# Patient Record
Sex: MALE | Race: WHITE | Employment: OTHER | URBAN - METROPOLITAN AREA
[De-identification: names, ages, dates, MRNs, and addresses within clinical notes are randomized per-mention and may not be internally consistent; named-entity substitution may affect disease eponyms.]

---

## 2017-01-27 ENCOUNTER — CARE COORDINATION (OUTPATIENT)
Dept: CARDIOLOGY | Facility: CLINIC | Age: 67
End: 2017-01-27

## 2017-01-27 DIAGNOSIS — I48.0 PAROXYSMAL ATRIAL FIBRILLATION (H): ICD-10-CM

## 2017-01-27 DIAGNOSIS — Z79.899 ON AMIODARONE THERAPY: Primary | ICD-10-CM

## 2017-01-31 ENCOUNTER — CARE COORDINATION (OUTPATIENT)
Dept: CARDIOLOGY | Facility: CLINIC | Age: 67
End: 2017-01-31

## 2017-01-31 DIAGNOSIS — I48.0 PAROXYSMAL ATRIAL FIBRILLATION (H): Primary | ICD-10-CM

## 2017-01-31 DIAGNOSIS — I48.91 ATRIAL FIBRILLATION STATUS POST CARDIOVERSION (H): ICD-10-CM

## 2017-01-31 DIAGNOSIS — I10 HTN (HYPERTENSION): ICD-10-CM

## 2017-01-31 RX ORDER — LISINOPRIL 20 MG/1
20 TABLET ORAL DAILY
Qty: 93 TABLET | Refills: 3
Start: 2017-01-31

## 2017-01-31 RX ORDER — DILTIAZEM HCL 90 MG
90 TABLET ORAL 2 TIMES DAILY
Qty: 90 TABLET | Refills: 3
Start: 2017-01-31 | End: 2017-08-03

## 2017-01-31 RX ORDER — AMIODARONE HYDROCHLORIDE 200 MG/1
200 TABLET ORAL EVERY OTHER DAY
Qty: 93 TABLET | Refills: 3
Start: 2017-01-31 | End: 2017-08-03

## 2017-01-31 NOTE — PROGRESS NOTES
Pt called, updated medication list.     Dc summary/[facility] stated pt only taking dilt 90 mg in am only, but pt has been taking 60 mg in pm.    Date: 1/31/2017    Time of Call: 11:09 AM     Diagnosis:  PVC     [ TORB ] Ordering provider: Dr Pérez   Order:   Finish supply of diltiazem 60 mg -once supply is gone  Take diltiazem 90 mg am.     If pt notes increase in SOB or tachycardia, may increase to diltiazem 90 mg bid.      Order received by: paula joel rn      Follow-up/additional notes: pt updated, agreed with plan.   Follow up schd in April with labs, pft.

## 2017-04-20 ENCOUNTER — PRE VISIT (OUTPATIENT)
Dept: CARDIOLOGY | Facility: CLINIC | Age: 67
End: 2017-04-20

## 2017-04-26 NOTE — TELEPHONE ENCOUNTER
Progress Note    Mr. Soto is a 66 year old male who has a past medical history significant for of NICM (LVEF 40-45%), CKD, HTN, Atrial fibrillation. CHADSVASC= 2 (+HTN, +HF)    He had frequent PVCs and NSVT in past.  He underwent VPC RFA  which appeared to eliminate one PVC morphology but a second near base of Pap muscle . Attempted RFA at that site caused Pericardial effusion requiring drainage. He has recovered well with Rx of colchicine and some prednisone.     He continues on warfarin followed in [place].    Tolerates amio 200 QD but feels that HR is slow. We will reduce to 100 daily    No new CV symptoms. Remains active golfing and bowling without any evident limitation. Asked about snowmobiling at high altitude. I advised against..      PAST MEDICAL HISTORY:  Past Medical History:   Diagnosis Date     Atrial fibrillation (H)      Cardiomyopathy (H)      CKD (chronic kidney disease)      Hypertension      Other premature beats      Palpitations      Fam HISTORY  Positive for CM requiring OHT in son    CURRENT MEDICATIONS:  Current Outpatient Prescriptions   Medication Sig Dispense Refill     amiodarone (PACERONE/CODARONE) 200 MG tablet Take 1 tablet (200 mg) by mouth every other day 93 tablet 3     lisinopril (PRINIVIL/ZESTRIL) 20 MG tablet Take 1 tablet (20 mg) by mouth daily In am 93 tablet 3     diltiazem (CARDIZEM) 90 MG tablet Take 1 tablet (90 mg) by mouth 2 times daily In am 90 tablet 3     warfarin (COUMADIN) 5 MG tablet Take 1-1.5 tablets (5-7.5 mg) by mouth daily 130 tablet 3     metoprolol (TOPROL-XL) 25 MG 24 hr tablet Take 1 tablet (25 mg) by mouth daily 90 tablet 3     fish oil-omega-3 fatty acids (FISH OIL) 1000 MG capsule Take 1 g by mouth daily.       Multiple Vitamin (MULTI VITAMIN MENS PO) Take  by mouth. 1 tablet daily       co-enzyme Q-10 (COQ10) 100 MG CAPS Take  by mouth daily. 1 caps         PAST SURGICAL HISTORY:  Past Surgical History:   Procedure Laterality Date     ANESTHESIA  CARDIOVERSION N/A 3/17/2015    Procedure: ANESTHESIA CARDIOVERSION;  Surgeon: Generic Anesthesia Provider;  Location: [facility]     ANESTHESIA CARDIOVERSION N/A 3/2/2016    Procedure: ANESTHESIA CARDIOVERSION;  Surgeon: GENERIC ANESTHESIA PROVIDER;  Location: [facility] OR     EP ABLATION / EP STUDIES  02/26/16    PVC ablation w/P. effusion/tamponade     LAPAROSCOPIC CHOLECYSTECTOMY  2/25/2014    Procedure: LAPAROSCOPIC CHOLECYSTECTOMY;  Laparoscopic Cholecystectomy;  Surgeon: Sulma Fenton MD;  Location: [facility] OR       ALLERGIES:   No Known Allergies              SOCIAL HISTORY:  Social History   Substance Use Topics     Smoking status: Never Smoker     Smokeless tobacco: Not on file     Alcohol use No       ROS:   Constitutional: No fever, chills, or sweats. Weight stable.   ENT: No visual disturbance, ear ache, epistaxis, sore throat.   Cardiovascular: As per HPI. CP elated to pericardial effusion / pericarditis is resolved  Respiratory: No cough, hemoptysis.    GI: No nausea, vomiting, hematemesis, melena, or hematochezia.   : No hematuria.   Integument: Negative.   Psychiatric: Negative.   Hematologic: no easy bleeding.  Neuro: Negative.   Endocrinology: No significant heat or cold intolerance   Musculoskeletal: No myalgia.    Exam:  There were no vitals taken for this visit.  GENERAL APPEARANCE: Pleasant healthy, alert and no distress  HEENT: no icterus, no xanthelasmas, normal pupil size and reaction, normal palate, mucosa moist, no central cyanosis  NECK: no adenopathy, no asymmetry, masses, or scars, thyroid normal to palpation and no bruits, JVP not elevated  RESPIRATORY: lungs clear to auscultation - no rales, rhonchi or wheezes, no use of accessory muscles, no retractions, respirations are unlabored, normal respiratory rate  CARDIOVASCULAR: regular rhythm, normal S1 with physiologic split S2, no S3 or S4 and no murmur, click or rub, precordium quiet with normal PMI.  ABDOMEN: soft, non tender, without  hepatosplenomegaly, no masses palpable, bowel sounds normal, aorta not enlarged by palpation, no abdominal bruits  EXTREMITIES: peripheral pulses normal, no edema, no bruits  NEURO: alert and oriented to person/place/time, normal speech, gait and affect  VASC:pulses are normal in volumes and symmetric bilaterally. No bruits are heard.  SKIN: no ecchymoses, no rashes    Labs:  CBC RESULTS:   Lab Results   Component Value Date    WBC 11.0 02/29/2016    RBC 3.53 (L) 02/29/2016    HGB 11.0 (L) 02/29/2016    HCT 33.2 (L) 02/29/2016    MCV 94 02/29/2016    MCH 31.2 02/29/2016    MCHC 33.1 02/29/2016    RDW 13.8 02/29/2016     (L) 02/29/2016       BMP RESULTS:  Lab Results   Component Value Date     03/02/2016    POTASSIUM 3.5 03/02/2016    CHLORIDE 101 03/02/2016    CO2 29 03/02/2016    ANIONGAP 6 03/02/2016     (H) 03/02/2016    BUN 27 03/02/2016    CR 1.44 (H) 03/02/2016    GFRESTIMATED 49 (L) 03/02/2016    GFRESTBLACK 59 (L) 03/02/2016    STEPHEN 8.8 03/02/2016        INR RESULTS:  Lab Results   Component Value Date    INR 1.08 03/02/2016    INR 1.14 03/01/2016    INR 1.21 (H) 02/26/2016    INR 2.22 (H) 07/02/2015       Procedures:    9/29/2016: Cardiac MRI :   Indication: Mr. 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 Repeat CMR to assess overall function.   Comparison: CMR 1/16  Technical quality: Good         IMPRESSION:  1. Moderately dilated left ventricular size and mildly reduced  systolic function with a calculated ejection fraction of 42 %.  2. Normal right ventricular size and mildly reduced systolic function  with a calculated ejection fraction of 47%.   3. On delayed enhancement imaging, there is focal epicardial  hyperenhancement in the basal inferolateral wall that appears less  prominent as compared to cardiac MRI 1/16.      The MRI sequences and  imaging planes in this study were tailored for  cardiac imaging and are suboptimal for evaluation of non-cardiac  structures.     FINDINGS     Left ventricle  Cavity size: Moderately dilated  Wall thickness: Normal  Global systolic function: Mildly depressed with a quantitative LV  ejection fraction of 42%.   Regional wall motion: Normal  Delayed enhancement: There is focal epicardial hyperenhancement in the  basal inferolateral wall that appears less prominent as compared to  cardiac MRI 1/16.      Right ventricle  Cavity size: Normal  Wall thickness: Normal  Global systolic function: Mildly depressed with a quantitative RV  ejection fraction of 47%.   Regional wall motion: Normal     Atria  LA size: Moderately dilated  RA size: Mildly dilated     Valves  Aortic Valve  Valve morphology: Tricuspid  Aortic stenosis: None  Aortic regurgitation: None     Mitral Valve  Mitral stenosis: None  Mitral regurgitation: Mild     Tricuspid Valve   Tricuspid stenosis: None  Tricuspid regurgitation: Mild     Pulmonic Valve  Pulmonic stenosis: None  Pulmonic regurgitation: None     Extracardiac  Aortic root dimension: 3.4 cm (at the sinuses of Valsalva)  Main PA dimension: 2.8 cm  Pericardial thickness: Normal  Pericardial effusion:None  Pleural effusion: None     Measurements     LEFT VENTRICULAR VOLUME RESULTS      ED volume: 258.88 ml   ED volume index: 111.24 ml/m2   ED volume/HT: 137.73 ml/m   ES volume: 151.31 ml   ES volume index: 65.01 ml/m2   Stroke volume: 107.57 ml   Stroke volume index: 46.22 ml/m2   Cardiac output: 7.32 l/min   Cardiac output index: 3.14 l/(m2*min)   Ejection fraction: 41.55 %          RIGHT VENTRICULAR VOLUME RESULTS      ED volume: 186.13 ml   ED volume index: 79.98 ml/m2   ED volume/HT: 99.03 ml/m   ES volume: 97.38 ml   ES volume index: 41.84 ml/m2   Stroke volume: 88.75 ml   Stroke volume index: 38.13 ml/m2   Cardiac output: 6.04 l/min   Cardiac output index: 2.59 l/(m2*min)   Ejection fraction:  47.68 %       LV wall thickness - Anteroseptal: 1.0 cm  LV wall thickness - Inferolateral: 1.1 cm  LV wall thickness - Maximum: 1.0 cm  LV end-diastolic diameter: 6.8 cm  LV end-systolic diameter: 5.4 cm  LA sam-posterior diameter: 4.8 cm     Sedation and contrast  Sedation used: None  Contrast agent: Gadavist  Volume administered: mL     Pulse sequences used  SSFP Localizers  SSFP Cine  IR SSFP Single Shot  IR GRE Segmented  Contrast-enhanced early and late enhancement imaging  Image post-processing was performed on a [employer] workstation    2/26/2016: WHIT/PVC ablation:   - Pericardial effusion w/tamponade    10/21/15: 30 DEM- home clinic/[place]:      Echocardiogram, PFT's   7/2/2015:    Interpretation Summary  Left ventricular size cannot evaluate.  The Ejection Fraction is estimated at 40-45%.  Right ventricular function, chamber size, wall motion, and thickness are  normal.  Pulmonary artery systolic pressure is normal.  The inferior vena cava is normal.  No pericardial effusion is present.      Cardioversion: 3/17/15    Echocardiogram: 6/5/2014:  Global and regional left ventricular function is normal with an EF of   60-65%.Severe LA enlargement.    PatientHeight: 75 in  PatientWeight: 237 lbs  SystolicPressure: 142 mmHg  DiastolicPressure: 77 mmHg    Compared to Previous Study  This study was compared with the study from 6/28/2012 .  There has been no change.    CT ANGIO CORONARY : Jul 3, 2012   Indication: Chest pain   Overall quality of the study: Excellent.   IMPRESSION:  1. Total Agatston score 0, placing the patient in the 0 percentile  when compared to age and gender matched control group.  2. At most, trivial coronary artery disease.  3. Mildly dilated sinuses of Valsalva.    ECHOCARDIOGRAM 6/28/2012  Interpretation Summary  No significant valvular abnormalities were noted.  Global and regional left ventricular function is normal with an EF of 60-65%.   Grade I of diastolic dysfunction Global right  ventricular function is normal.   Severe left atrial enlargement is present. Pulmonary artery systolic pressure   cannot be assessed. The inferior vena cava cannot be assessed. No   pericardial effusion is present.    Assessment and Plan:   Mr. Soto is a 66 year old male who has a past medical history significant for of NICM (LVEF 40-45%), CKD, HTN, and Atrial fibrillation. CHADSVASC= 2 (+HTN, +HF).    1. NICM, clinically stable. MR showed    -  2. Atrial fibrillation, PVC's the patient's ECG today shows he is in  sinus rhythm PVC having RBBB morphology with LAD present. Consistent with PVC #2 during EPS    3. Bradycardia. Will reduce amio to 100 daily    4. Advised against high altitude snowmobiling. Other wise remain active as in recent past    5 Continue warfarin    I very much appreciated the opportunity to see and assess Mr Endy Soto in the clinic today    Please do not hesitate to contact my office if you have any questions or concerns.       Nelson Pérez MD  Cardiac Arrhythmia Service  [facility]  [phone]  CC  Patient Care Team:  Dinorah Ugarte as PCP - General (Family Practice)  Nelson Pérez MD as Cardiologist (Cardiology)  Daphnie Aguila, KIARA as Nurse Coordinator (Cardiology)  CAMILLE CHAU

## 2017-04-27 ENCOUNTER — OFFICE VISIT (OUTPATIENT)
Dept: CARDIOLOGY | Facility: CLINIC | Age: 67
End: 2017-04-27
Attending: INTERNAL MEDICINE
Payer: MEDICARE

## 2017-04-27 VITALS
BODY MASS INDEX: 29.68 KG/M2 | SYSTOLIC BLOOD PRESSURE: 140 MMHG | HEART RATE: 65 BPM | HEIGHT: 76 IN | WEIGHT: 243.7 LBS | DIASTOLIC BLOOD PRESSURE: 81 MMHG | OXYGEN SATURATION: 96 %

## 2017-04-27 DIAGNOSIS — Z79.899 ON AMIODARONE THERAPY: ICD-10-CM

## 2017-04-27 DIAGNOSIS — I48.0 PAROXYSMAL ATRIAL FIBRILLATION (H): ICD-10-CM

## 2017-04-27 DIAGNOSIS — I49.3 PVC'S (PREMATURE VENTRICULAR CONTRACTIONS): Primary | ICD-10-CM

## 2017-04-27 DIAGNOSIS — I42.8 NICM (NONISCHEMIC CARDIOMYOPATHY) (H): ICD-10-CM

## 2017-04-27 LAB
ALBUMIN SERPL-MCNC: 4.1 G/DL (ref 3.4–5)
ALP SERPL-CCNC: 62 U/L (ref 40–150)
ALT SERPL W P-5'-P-CCNC: 21 U/L (ref 0–70)
ANION GAP SERPL CALCULATED.3IONS-SCNC: 5 MMOL/L (ref 3–14)
AST SERPL W P-5'-P-CCNC: 18 U/L (ref 0–45)
BILIRUB SERPL-MCNC: 1.4 MG/DL (ref 0.2–1.3)
BUN SERPL-MCNC: 18 MG/DL (ref 7–30)
CALCIUM SERPL-MCNC: 9.5 MG/DL (ref 8.5–10.1)
CHLORIDE SERPL-SCNC: 107 MMOL/L (ref 94–109)
CO2 SERPL-SCNC: 27 MMOL/L (ref 20–32)
CREAT SERPL-MCNC: 1.42 MG/DL (ref 0.66–1.25)
ERYTHROCYTE [DISTWIDTH] IN BLOOD BY AUTOMATED COUNT: 12.8 % (ref 10–15)
GFR SERPL CREATININE-BSD FRML MDRD: 50 ML/MIN/1.7M2
GLUCOSE SERPL-MCNC: 85 MG/DL (ref 70–99)
HCT VFR BLD AUTO: 47.2 % (ref 40–53)
HGB BLD-MCNC: 15.7 G/DL (ref 13.3–17.7)
MCH RBC QN AUTO: 30.5 PG (ref 26.5–33)
MCHC RBC AUTO-ENTMCNC: 33.3 G/DL (ref 31.5–36.5)
MCV RBC AUTO: 92 FL (ref 78–100)
PLATELET # BLD AUTO: 213 10E9/L (ref 150–450)
POTASSIUM SERPL-SCNC: 4.2 MMOL/L (ref 3.4–5.3)
PROT SERPL-MCNC: 7.4 G/DL (ref 6.8–8.8)
RBC # BLD AUTO: 5.15 10E12/L (ref 4.4–5.9)
SODIUM SERPL-SCNC: 139 MMOL/L (ref 133–144)
TSH SERPL DL<=0.005 MIU/L-ACNC: 2.69 MU/L (ref 0.4–4)
WBC # BLD AUTO: 7.1 10E9/L (ref 4–11)

## 2017-04-27 PROCEDURE — 80053 COMPREHEN METABOLIC PANEL: CPT | Performed by: INTERNAL MEDICINE

## 2017-04-27 PROCEDURE — 36415 COLL VENOUS BLD VENIPUNCTURE: CPT | Performed by: INTERNAL MEDICINE

## 2017-04-27 PROCEDURE — 99213 OFFICE O/P EST LOW 20 MIN: CPT | Mod: ZP | Performed by: INTERNAL MEDICINE

## 2017-04-27 PROCEDURE — 99212 OFFICE O/P EST SF 10 MIN: CPT

## 2017-04-27 PROCEDURE — 93010 ELECTROCARDIOGRAM REPORT: CPT | Mod: ZP | Performed by: INTERNAL MEDICINE

## 2017-04-27 PROCEDURE — 93005 ELECTROCARDIOGRAM TRACING: CPT | Mod: ZF

## 2017-04-27 PROCEDURE — 85027 COMPLETE CBC AUTOMATED: CPT | Performed by: INTERNAL MEDICINE

## 2017-04-27 PROCEDURE — 84443 ASSAY THYROID STIM HORMONE: CPT | Performed by: INTERNAL MEDICINE

## 2017-04-27 ASSESSMENT — PAIN SCALES - GENERAL: PAINLEVEL: NO PAIN (0)

## 2017-04-27 NOTE — LETTER
4/27/2017    RE: Endy Soto  [address] [facility] [address]       Dear Colleague,    Thank you for the opportunity to participate in the care of your patient, Endy Soto, at the [facility] HEART [facility]. Please see a copy of my visit note below.          Clinical Cardiac Electrophysiology    Chief Complaint: follow up for PVCs    HPI: Mr. Soto is a 66 year old male who has a past medical history significant for of NICM (LVEF 40-45%), CKD, HTN, Atrial fibrillation. CHADSVASC= 2 (+HTN, +HF)     3/29/2016   He had frequent PVCs and NSVT in past. He underwent PVA RFA which appeared to eliminate one PVC morphology but a second near base of Pap muscle . Attempted RFA at that site caused Pericardial effusion requiring drainage. He has recovered well with Rx of colchicine and some prednisone. He continues on warfarin followed in [place]. Remains active golfing and bowling without any evident limitation.     4/27/2017  Current cardiac medications include amiodarone, lisinopril, diltiazem, metoprolol, fish oil, warfarin    Since last visit, he's doing well. No chest pain, dyspnea, palpitations.     Current Outpatient Prescriptions   Medication Sig Dispense Refill     amiodarone (PACERONE/CODARONE) 200 MG tablet Take 1 tablet (200 mg) by mouth every other day 93 tablet 3     lisinopril (PRINIVIL/ZESTRIL) 20 MG tablet Take 1 tablet (20 mg) by mouth daily In am 93 tablet 3     diltiazem (CARDIZEM) 90 MG tablet Take 1 tablet (90 mg) by mouth 2 times daily In am (Patient taking differently: Take 90 mg by mouth daily In am) 90 tablet 3     warfarin (COUMADIN) 5 MG tablet Take 1-1.5 tablets (5-7.5 mg) by mouth daily (Patient taking differently: Take 5-7.5 mg by mouth daily As directed by the coumadin clinic) 130 tablet 3     metoprolol (TOPROL-XL) 25 MG 24 hr tablet Take 1 tablet (25 mg) by mouth daily 90 tablet 3     fish oil-omega-3 fatty acids (FISH OIL) 1000 MG  capsule Take 1 g by mouth daily.       Multiple Vitamin (MULTI VITAMIN MENS PO) Take  by mouth. 1 tablet daily       co-enzyme Q-10 (COQ10) 100 MG CAPS Take  by mouth daily. 1 caps         Past Medical History:   Diagnosis Date     Atrial fibrillation (H)      Cardiomyopathy (H)      CKD (chronic kidney disease)      Hypertension      Other premature beats      Palpitations        Past Surgical History:   Procedure Laterality Date     ANESTHESIA CARDIOVERSION N/A 3/17/2015    Procedure: ANESTHESIA CARDIOVERSION;  Surgeon: Generic Anesthesia Provider;  Location: [facility] OR     ANESTHESIA CARDIOVERSION N/A 3/2/2016    Procedure: ANESTHESIA CARDIOVERSION;  Surgeon: GENERIC ANESTHESIA PROVIDER;  Location: [facility] OR     EP ABLATION / EP STUDIES  02/26/16    PVC ablation w/P. effusion/tamponade     LAPAROSCOPIC CHOLECYSTECTOMY  2/25/2014    Procedure: LAPAROSCOPIC CHOLECYSTECTOMY;  Laparoscopic Cholecystectomy;  Surgeon: Sulma Fenton MD;  Location: [facility] OR       No family history on file.    Social History   Substance Use Topics     Smoking status: Never Smoker     Smokeless tobacco: Not on file     Alcohol use No       No Known Allergies    ROS:   CONSTITUTIONAL:No report of fever, chills  RESPIRATORY: No cough, wheezing, SOB, or hemoptysis  CARDIOVASCULAR: see HPI  MUSCULO-SKELETAL: No joint pain/swelling, no muslce pain  NEURO: No paresthesias, syncope, pre-syncope, light headness, dizziness or vertigo  ENDOCRINE: No temperature intolerance, no skin/hair changes  PSYCHIATRIC: No change in mood, feeling down/anxious, no change in sleep or appetite  GI: no melena or hematochezia, no change in bowel habits  : no hematuria or dysurea, no hesitancy, dribbling or incontinence  HEME: no easy bruising or bleeding, no history of anemia, no history of blood clots  SKIN: no rashes or sores, no unusual itching. Some facial flushing    Physical Examination:  Vitals: /82 (BP Location: Right arm, Patient Position: Chair,  "Cuff Size: Adult Large)  Pulse 65  Ht 1.93 m (6' 4\")  Wt 110.5 kg (243 lb 11.2 oz)  SpO2 96%  BMI 29.66 kg/m2  BMI= Body mass index is 29.66 kg/(m^2).    GENERAL APPEARANCE: healthy, alert and no distress  NECK: JVP is not visible  CHEST: lungs clear to auscultation - no rales, rhonchi or wheezes  CARDIOVASCULAR: regular rhythm, normal S1 with physiologic split S2, no S3 or S4 and no murmur  ABDOMEN: soft, non tender, without hepatosplenomegaly  EXTREMITIES: no edema  NEURO: alert and oriented to person/place/time, normal speech, gait and affect  SKIN: no ecchymoses, no rashes    CMri 9/2016  1. Moderately dilated left ventricular size and mildly reduced systolic function with a calculated ejection fraction of 42 %.  2. Normal right ventricular size and mildly reduced systolic function with a calculated ejection fraction of 47%.   3. On delayed enhancement imaging, there is focal epicardial hyperenhancement in the basal inferolateral wall that appears less  prominent as compared to cardiac MRI 1/16.     Assessment and recommendations:  Mr. Soto is a 66 year old male who has a past medical history significant for of NICM (LVEF 40-45%), CKD, HTN, and Atrial fibrillation. CHADSVASC= 2 (+HTN, +HF).    1. NICM -  clinically stable. LVEF 42% on last MRI. Plan to repeat MRI prior to next visit.     2. PVC's - patient's ECG today shows he is in sinus rhythm PVC having RBBB morphology with LAD present. Consistent with PVC #2 during EPS.     3. Bradycardia - amio 100 daily    4. Atrial fibrillation - continue warfarin    I appreciate the chance to help with Mr. Soto care. Please let me know if you have any questions or concerns.    I very much appreciated the opportunity to see and assess Mr Endy Soto in the clinic with Diana Cedeno CNA. I spent approx 20 min with patient and spouse reviweing history and providing recommendations. Today I reviewed his ECG which showed occas PVC of single morphology.  He " denied any CP, SOBE or exertional intolerance. He did note some facial flushing that may be amio related. I advised his wearing hat whenever exposed to sun.  Amio labs reviewed and OK.  Please do not hesitate to contact my office if you have any questions or concerns.      Nelson Pérez MD  Cardiac Arrhythmia Service  [facility]  [phone]

## 2017-04-27 NOTE — MR AVS SNAPSHOT
After Visit Summary   4/27/2017    Endy Soto    MRN: [id number]           Patient Information     Date Of Birth          1950        Visit Information        Provider Department      4/27/2017 12:30 PM Nelson Pérez MD [facility]        Today's Diagnoses     PVC's (premature ventricular contractions)    -  1    NICM (nonischemic cardiomyopathy) (H)          Care Instructions    You will be scheduled for a follow up visit as instructed.    If you have any questions regarding to your visit please contact your care team:     Cardiology  Telephone Number    Daphnie Aguila [phone]   Or send a message to your provider via my chart.   For scheduling appts or procedures:    Sheela Franklin     [phone] or  [phone]   For the Device Clinic (Pacemakers and ICD's)   RN's :   Sandi Morgan  During business hours: [phone]    After business hours:   [phone]- select option 4 and ask for job code [phone].    You can also contact us using My Chart. If you need assistance in setting this up, please contact our office or ask at your follow up visit.     If you need a medication refill please contact your pharmacy. Please allow 3 business days for your refill to be completed.     As always, Thank you for trusting us with your health care needs!        Follow-ups after your visit        Follow-up notes from your care team     Return in about 6 months (around 10/27/2017) for johnathon, and cardiac MRI prior.      Your next 10 appointments already scheduled     Nov 02, 2017 11:00 AM CDT   MR MYOCARDIUM W CONTRAST with UUMR4, [facility] CV MR NURSE   [facility] [place] ([facility])    [address] [facility] [facility] [address]   [phone]           Take your medicines as usual, unless your doctor tells you not to. Bring a list of your current medicines to your exam (including vitamins, minerals and over-the-counter  drugs).  You will be given intravenous contrast for this exam. To prepare:   The day before your exam, drink extra fluids at least six 8-ounce glasses (unless your doctor tells you to restrict your fluids).   Have a blood test (creatinine test) within 30 days of your exam. Go to your clinic or Diagnostic Imaging Department for this test.  The MRI machine uses a strong magnet. Please wear clothes without metal (snaps, zippers). A sweatsuit works well, or we may give you a hospital gown.  Please remove any body piercings and hair extensions before you arrive. You will also remove watches, jewelry, hairpins, wallets, dentures, partial dental plates and hearing aids. You may wear contact lenses, and you may be able to wear your rings. We have a safe place to keep your personal items, but it is safer to leave them at home.   **IMPORTANT** THE INSTRUCTIONS BELOW ARE ONLY FOR THOSE PATIENTS WHO HAVE BEEN TOLD THEY WILL RECEIVE SEDATION OR GENERAL ANESTHESIA DURING THEIR MRI PROCEDURE:  IF YOU WILL RECEIVE SEDATION (take medicine to help you relax during your exam):   You must get the medicine from your doctor before you arrive. Bring the medicine to the exam. Do not take it at home.   Arrive one hour early. Bring someone who can take you home after the test. Your medicine will make you sleepy. After the exam, you may not drive, take a bus or take a taxi by yourself.   No eating 8 hours before your exam. You may have clear liquids up until 4 hours before your exam. (Clear liquids include water, clear tea, black coffee and fruit juice without pulp.)  IF YOU WILL RECEIVE ANESTHESIA (be asleep for your exam):   Arrive 1 1/2 hours early. Bring someone who can take you home after the test. You may not drive, take a bus or take a taxi by yourself.   No eating 8 hours before your exam. You may have clear liquids up until 4 hours before your exam. (Clear liquids include water, clear tea, black coffee and fruit juice without pulp.)   "Please call the Imaging Department at your exam site with any questions.            Nov 02, 2017  2:30 PM CDT   (Arrive by 2:15 PM)   RETURN ARRHYTHMIA with Nelson Pérez MD   [facility] ([facility])    [address] [id number] [phone]              Future tests that were ordered for you today     Open Future Orders        Priority Expected Expires Ordered    MRI Cardiac w/contrast Routine 10/2/2017 4/27/2018 4/27/2017            Who to contact     If you have questions or need follow up information about today's clinic visit or your schedule please contact [facility] directly at [phone].  Normal or non-critical lab and imaging results will be communicated to you by [employer], letter or phone within 4 business days after the clinic has received the results. If you do not hear from us within 7 days, please contact the clinic through [employer] or phone. If you have a critical or abnormal lab result, we will notify you by phone as soon as possible.  Submit refill requests through [employer] or call your pharmacy and they will forward the refill request to us. Please allow 3 business days for your refill to be completed.          Additional Information About Your Visit        Minggl Information     [employer] gives you secure access to your electronic health record. If you see a primary care provider, you can also send messages to your care team and make appointments. If you have questions, please call your primary care clinic.  If you do not have a primary care provider, please call [phone] and they will assist you.        Care EveryWhere ID     This is your Care EveryWhere ID. This could be used by other organizations to access your [place] medical records  [id number]        Your Vitals Were     Pulse Height Pulse Oximetry BMI (Body Mass Index)          65 1.93 m (6' 4\") 96% 29.66 kg/m2         Blood Pressure from Last 3 " Encounters:   04/27/17 140/81   09/29/16 148/87   03/24/16 139/85    Weight from Last 3 Encounters:   04/27/17 110.5 kg (243 lb 11.2 oz)   09/29/16 106.6 kg (235 lb)   03/24/16 106.7 kg (235 lb 3.2 oz)              We Performed the Following     EKG 12-lead, tracing only (Future)          Today's Medication Changes          These changes are accurate as of: 4/27/17  1:31 PM.  If you have any questions, ask your nurse or doctor.               These medicines have changed or have updated prescriptions.        Dose/Directions    diltiazem 90 MG tablet   Commonly known as:  CARDIZEM   This may have changed:    - when to take this  - additional instructions   Used for:  Atrial fibrillation status post cardioversion (H)        Dose:  90 mg   Take 1 tablet (90 mg) by mouth 2 times daily In am   Quantity:  90 tablet   Refills:  3       warfarin 5 MG tablet   Commonly known as:  COUMADIN   This may have changed:  additional instructions   Used for:  Paroxysmal atrial fibrillation (H)        Dose:  5-7.5 mg   Take 1-1.5 tablets (5-7.5 mg) by mouth daily   Quantity:  130 tablet   Refills:  3                Primary Care Provider Office Phone # Fax #    Marko Aparicio [phone]       [address] [address]        Thank you!     Thank you for choosing [facility]  for your care. Our goal is always to provide you with excellent care. Hearing back from our patients is one way we can continue to improve our services. Please take a few minutes to complete the written survey that you may receive in the mail after your visit with us. Thank you!             Your Updated Medication List - Protect others around you: Learn how to safely use, store and throw away your medicines at www.disposemymeds.org.          This list is accurate as of: 4/27/17  1:31 PM.  Always use your most recent med list.                   Brand Name Dispense Instructions for use    amiodarone 200 MG  tablet    PACERONE/CODARONE    93 tablet    Take 1 tablet (200 mg) by mouth every other day       co-enzyme Q-10 100 MG Caps capsule      Take  by mouth daily. 1 caps       diltiazem 90 MG tablet    CARDIZEM    90 tablet    Take 1 tablet (90 mg) by mouth 2 times daily In am       fish oil-omega-3 fatty acids 1000 MG capsule      Take 1 g by mouth daily.       lisinopril 20 MG tablet    PRINIVIL/ZESTRIL    93 tablet    Take 1 tablet (20 mg) by mouth daily In am       metoprolol 25 MG 24 hr tablet    TOPROL-XL    90 tablet    Take 1 tablet (25 mg) by mouth daily       MULTI VITAMIN MENS PO      Take  by mouth. 1 tablet daily       warfarin 5 MG tablet    COUMADIN    130 tablet    Take 1-1.5 tablets (5-7.5 mg) by mouth daily

## 2017-04-27 NOTE — PATIENT INSTRUCTIONS
You will be scheduled for a follow up visit as instructed.    If you have any questions regarding to your visit please contact your care team:     Cardiology  Telephone Number    Daphnie Aguila [phone]   Or send a message to your provider via my chart.   For scheduling appts or procedures:    Sheela Franklin     [phone] or  [phone]   For the Device Clinic (Pacemakers and ICD's)   RN's :   Sandi Morgan  During business hours: [phone]    After business hours:   [phone]- select option 4 and ask for job code [id number].    You can also contact us using My Chart. If you need assistance in setting this up, please contact our office or ask at your follow up visit.     If you need a medication refill please contact your pharmacy. Please allow 3 business days for your refill to be completed.     As always, Thank you for trusting us with your health care needs!

## 2017-04-27 NOTE — NURSING NOTE
Chief Complaint   Patient presents with     Follow Up For     lab, PFT, EKG- PAF, 6 MFU (9/29/16), hx PVC, NSVT, PAF- on amio 100 mg qd, dilt . CMRI 9/2016

## 2017-04-27 NOTE — PROGRESS NOTES
Clinical Cardiac Electrophysiology    Chief Complaint: follow up for PVCs    HPI: Mr. Soto is a 66 year old male who has a past medical history significant for of NICM (LVEF 40-45%), CKD, HTN, Atrial fibrillation. CHADSVASC= 2 (+HTN, +HF)     3/29/2016   He had frequent PVCs and NSVT in past. He underwent PVA RFA which appeared to eliminate one PVC morphology but a second near base of Pap muscle . Attempted RFA at that site caused Pericardial effusion requiring drainage. He has recovered well with Rx of colchicine and some prednisone. He continues on warfarin followed in [facility]. Remains active golfing and bowling without any evident limitation.     4/27/2017  Current cardiac medications include amiodarone, lisinopril, diltiazem, metoprolol, fish oil, warfarin    Since last visit, he's doing well. No chest pain, dyspnea, palpitations.     Current Outpatient Prescriptions   Medication Sig Dispense Refill     amiodarone (PACERONE/CODARONE) 200 MG tablet Take 1 tablet (200 mg) by mouth every other day 93 tablet 3     lisinopril (PRINIVIL/ZESTRIL) 20 MG tablet Take 1 tablet (20 mg) by mouth daily In am 93 tablet 3     diltiazem (CARDIZEM) 90 MG tablet Take 1 tablet (90 mg) by mouth 2 times daily In am (Patient taking differently: Take 90 mg by mouth daily In am) 90 tablet 3     warfarin (COUMADIN) 5 MG tablet Take 1-1.5 tablets (5-7.5 mg) by mouth daily (Patient taking differently: Take 5-7.5 mg by mouth daily As directed by the coumadin clinic) 130 tablet 3     metoprolol (TOPROL-XL) 25 MG 24 hr tablet Take 1 tablet (25 mg) by mouth daily 90 tablet 3     fish oil-omega-3 fatty acids (FISH OIL) 1000 MG capsule Take 1 g by mouth daily.       Multiple Vitamin (MULTI VITAMIN MENS PO) Take  by mouth. 1 tablet daily       co-enzyme Q-10 (COQ10) 100 MG CAPS Take  by mouth daily. 1 caps         Past Medical History:   Diagnosis Date     Atrial fibrillation (H)      Cardiomyopathy (H)      CKD (chronic kidney disease)  "     Hypertension      Other premature beats      Palpitations        Past Surgical History:   Procedure Laterality Date     ANESTHESIA CARDIOVERSION N/A 3/17/2015    Procedure: ANESTHESIA CARDIOVERSION;  Surgeon: Generic Anesthesia Provider;  Location: [facility]     ANESTHESIA CARDIOVERSION N/A 3/2/2016    Procedure: ANESTHESIA CARDIOVERSION;  Surgeon: GENERIC ANESTHESIA PROVIDER;  Location: [facility] OR     EP ABLATION / EP STUDIES  02/26/16    PVC ablation w/P. effusion/tamponade     LAPAROSCOPIC CHOLECYSTECTOMY  2/25/2014    Procedure: LAPAROSCOPIC CHOLECYSTECTOMY;  Laparoscopic Cholecystectomy;  Surgeon: Sulma Fenton MD;  Location: [facility] OR       No family history on file.    Social History   Substance Use Topics     Smoking status: Never Smoker     Smokeless tobacco: Not on file     Alcohol use No       No Known Allergies    ROS:   CONSTITUTIONAL:No report of fever, chills  RESPIRATORY: No cough, wheezing, SOB, or hemoptysis  CARDIOVASCULAR: see HPI  MUSCULO-SKELETAL: No joint pain/swelling, no muslce pain  NEURO: No paresthesias, syncope, pre-syncope, light headness, dizziness or vertigo  ENDOCRINE: No temperature intolerance, no skin/hair changes  PSYCHIATRIC: No change in mood, feeling down/anxious, no change in sleep or appetite  GI: no melena or hematochezia, no change in bowel habits  : no hematuria or dysurea, no hesitancy, dribbling or incontinence  HEME: no easy bruising or bleeding, no history of anemia, no history of blood clots  SKIN: no rashes or sores, no unusual itching. Some facial flushing    Physical Examination:  Vitals: /82 (BP Location: Right arm, Patient Position: Chair, Cuff Size: Adult Large)  Pulse 65  Ht 1.93 m (6' 4\")  Wt 110.5 kg (243 lb 11.2 oz)  SpO2 96%  BMI 29.66 kg/m2  BMI= Body mass index is 29.66 kg/(m^2).    GENERAL APPEARANCE: healthy, alert and no distress  NECK: JVP is not visible  CHEST: lungs clear to auscultation - no rales, rhonchi or " wheezes  CARDIOVASCULAR: regular rhythm, normal S1 with physiologic split S2, no S3 or S4 and no murmur  ABDOMEN: soft, non tender, without hepatosplenomegaly  EXTREMITIES: no edema  NEURO: alert and oriented to person/place/time, normal speech, gait and affect  SKIN: no ecchymoses, no rashes    CMri 9/2016  1. Moderately dilated left ventricular size and mildly reduced systolic function with a calculated ejection fraction of 42 %.  2. Normal right ventricular size and mildly reduced systolic function with a calculated ejection fraction of 47%.   3. On delayed enhancement imaging, there is focal epicardial hyperenhancement in the basal inferolateral wall that appears less  prominent as compared to cardiac MRI 1/16.     Assessment and recommendations:  Mr. Soto is a 66 year old male who has a past medical history significant for of NICM (LVEF 40-45%), CKD, HTN, and Atrial fibrillation. CHADSVASC= 2 (+HTN, +HF).    1. NICM -  clinically stable. LVEF 42% on last MRI. Plan to repeat MRI prior to next visit.     2. PVC's - patient's ECG today shows he is in sinus rhythm PVC having RBBB morphology with LAD present. Consistent with PVC #2 during EPS.     3. Bradycardia - amio 100 daily    4. Atrial fibrillation - continue warfarin    I appreciate the chance to help with Mr. Soto care. Please let me know if you have any questions or concerns.    I very much appreciated the opportunity to see and assess Mr Endy Soto in the clinic with Diana Cedeno CNA. I spent approx 20 min with patient and spouse reviweing history and providing recommendations. Today I reviewed his ECG which showed occas PVC of single morphology.  He denied any CP, SOBE or exertional intolerance. He did note some facial flushing that may be amio related. I advised his wearing hat whenever exposed to sun.  Amio labs reviewed and OK.  Please do not hesitate to contact my office if you have any questions or concerns.      Nelson Pérez  MD  Cardiac Arrhythmia Service  [facility]  [phone]

## 2017-04-28 LAB — INTERPRETATION ECG - MUSE: NORMAL

## 2017-05-16 LAB
DLCOUNC-%PRED-PRE: 123 %
DLCOUNC-PRE: 35.54 ML/MIN/MMHG
DLCOUNC-PRED: 28.76 ML/MIN/MMHG
ERV-%PRED-PRE: 38 %
ERV-PRE: 0.5 L
ERV-PRED: 1.29 L
EXPTIME-PRE: 10.75 SEC
FEF2575-%PRED-PRE: 75 %
FEF2575-PRE: 2.18 L/SEC
FEF2575-PRED: 2.88 L/SEC
FEFMAX-%PRED-PRE: 107 %
FEFMAX-PRE: 10.52 L/SEC
FEFMAX-PRED: 9.75 L/SEC
FEV1-%PRED-PRE: 87 %
FEV1-PRE: 3.36 L
FEV1FEV6-PRE: 74 %
FEV1FEV6-PRED: 78 %
FEV1FVC-PRE: 72 %
FEV1FVC-PRED: 75 %
FEV1SVC-PRE: 74 %
FEV1SVC-PRED: 68 %
FIFMAX-PRE: 10.02 L/SEC
FVC-%PRED-PRE: 91 %
FVC-PRE: 4.68 L
FVC-PRED: 5.13 L
IC-%PRED-PRE: 91 %
IC-PRE: 4.05 L
IC-PRED: 4.4 L
VA-%PRED-PRE: 87 %
VA-PRE: 6.79 L
VC-%PRED-PRE: 79 %
VC-PRE: 4.54 L
VC-PRED: 5.69 L

## 2017-08-03 ENCOUNTER — PRE VISIT (OUTPATIENT)
Dept: CARDIOLOGY | Facility: CLINIC | Age: 67
End: 2017-08-03

## 2017-08-03 ENCOUNTER — OFFICE VISIT (OUTPATIENT)
Dept: CARDIOLOGY | Facility: CLINIC | Age: 67
End: 2017-08-03
Attending: INTERNAL MEDICINE
Payer: COMMERCIAL

## 2017-08-03 VITALS
OXYGEN SATURATION: 97 % | BODY MASS INDEX: 29.51 KG/M2 | HEART RATE: 66 BPM | DIASTOLIC BLOOD PRESSURE: 88 MMHG | WEIGHT: 242.3 LBS | SYSTOLIC BLOOD PRESSURE: 137 MMHG | HEIGHT: 76 IN

## 2017-08-03 DIAGNOSIS — I48.0 PAROXYSMAL ATRIAL FIBRILLATION (H): ICD-10-CM

## 2017-08-03 DIAGNOSIS — I49.3 PVC'S (PREMATURE VENTRICULAR CONTRACTIONS): ICD-10-CM

## 2017-08-03 DIAGNOSIS — I48.0 PAROXYSMAL ATRIAL FIBRILLATION (H): Primary | ICD-10-CM

## 2017-08-03 DIAGNOSIS — I48.91 ATRIAL FIBRILLATION STATUS POST CARDIOVERSION (H): ICD-10-CM

## 2017-08-03 PROCEDURE — 99214 OFFICE O/P EST MOD 30 MIN: CPT | Mod: 25 | Performed by: INTERNAL MEDICINE

## 2017-08-03 PROCEDURE — 93005 ELECTROCARDIOGRAM TRACING: CPT | Mod: ZF

## 2017-08-03 PROCEDURE — 93010 ELECTROCARDIOGRAM REPORT: CPT | Mod: ZP | Performed by: INTERNAL MEDICINE

## 2017-08-03 RX ORDER — DILTIAZEM HCL 90 MG
90 TABLET ORAL DAILY
Start: 2017-08-03 | End: 2017-09-10

## 2017-08-03 RX ORDER — WARFARIN SODIUM 5 MG/1
5-7.5 TABLET ORAL DAILY
Qty: 130 TABLET | Refills: 3
Start: 2017-08-03 | End: 2017-09-10

## 2017-08-03 RX ORDER — METOPROLOL SUCCINATE 25 MG/1
25 TABLET, EXTENDED RELEASE ORAL DAILY
Start: 2017-08-03 | End: 2017-08-10

## 2017-08-03 RX ORDER — AMIODARONE HYDROCHLORIDE 200 MG/1
200 TABLET ORAL DAILY
Qty: 93 TABLET | Refills: 3
Start: 2017-08-03 | End: 2017-10-05

## 2017-08-03 RX ORDER — LIDOCAINE 40 MG/G
CREAM TOPICAL
Status: CANCELLED | OUTPATIENT
Start: 2017-08-03

## 2017-08-03 ASSESSMENT — PAIN SCALES - GENERAL: PAINLEVEL: NO PAIN (0)

## 2017-08-03 NOTE — PROGRESS NOTES
Clinical Cardiac Electrophysiology    Chief Complaint: follow up for PVCs, PAF.    HPI: Mr. Soto is 66 year old male with a PMHx of NICM (LVEF 42%), CKD, HTN, Atrial Fibrillation on AC, PVC's s/p ablation who presents for follow up.     Patient had a PVC ablation in 2016 that was complicated by pericardial tamponade. After drainage, patient was in atrial fibrillation with RVR. He was cardioverted with good results for around a year. He was on Amio, with good maintenance of sinus rhythm.     However, recently, patient has had several episodes where he felt more symptomatic with his atrial fibrillation. These episodes usually occur when he is having a non-specific chest/rib pain. The chest pain is worse upon palpitation and sometimes patient can not lay on that side. He denies positional changes or pain during inspiration. When this pain is severe, he notes that he is in atrial fibrillation. His symptoms include chest tightness, nausea, and shortness of breath.     Patient has seen his PCP for this pain. He was prescribed steroids which completely resolved the pain. However, when he stops the steroids, the pain can recur and his symptoms of atrial fibrillation return. He was most recently prescribed steroids two days ago with again improvement in his symptoms.     Overall, the story is unclear if patient is in persistent atrial fibrillation or going in and out of atrial fibrillation with this chest pain.     He is currently on  amiodarone 200mg daily, Diltiazem 90mg in the AM, and Metoprolol 25mg BID.     Patient denies chest pain, orthopnea, dizziness, LH, syncope.       Current Outpatient Prescriptions   Medication Sig Dispense Refill     PREDNISONE PO Take 10 mg by mouth        amiodarone (PACERONE/CODARONE) 200 MG tablet Take 1 tablet (200 mg) by mouth every other day 93 tablet 3     lisinopril (PRINIVIL/ZESTRIL) 20 MG tablet Take 1 tablet (20 mg) by mouth daily In am 93 tablet 3     diltiazem (CARDIZEM)  "90 MG tablet Take 1 tablet (90 mg) by mouth 2 times daily In am (Patient taking differently: Take 90 mg by mouth daily In am) 90 tablet 3     warfarin (COUMADIN) 5 MG tablet Take 1-1.5 tablets (5-7.5 mg) by mouth daily (Patient taking differently: Take 5-7.5 mg by mouth daily As directed by the coumadin clinic) 130 tablet 3     metoprolol (TOPROL-XL) 25 MG 24 hr tablet Take 1 tablet (25 mg) by mouth daily (Patient taking differently: Take 25 mg by mouth daily 2 po daily( 50 mg daily)) 90 tablet 3     fish oil-omega-3 fatty acids (FISH OIL) 1000 MG capsule Take 1 g by mouth daily.       Multiple Vitamin (MULTI VITAMIN MENS PO) Take  by mouth. 1 tablet daily       co-enzyme Q-10 (COQ10) 100 MG CAPS Take  by mouth daily. 1 caps         Past Medical History:   Diagnosis Date     Atrial fibrillation (H)      Cardiomyopathy (H)      CKD (chronic kidney disease)      Hypertension      Other premature beats      Palpitations        Past Surgical History:   Procedure Laterality Date     ANESTHESIA CARDIOVERSION N/A 3/17/2015    Procedure: ANESTHESIA CARDIOVERSION;  Surgeon: Generic Anesthesia Provider;  Location[facility]     ANESTHESIA CARDIOVERSION N/A 3/2/2016    Procedure: ANESTHESIA CARDIOVERSION;  Surgeon: GENERIC ANESTHESIA PROVIDER;  Location[facility] OR     EP ABLATION / EP STUDIES  02/26/16    PVC ablation w/P. effusion/tamponade     LAPAROSCOPIC CHOLECYSTECTOMY  2/25/2014    Procedure: LAPAROSCOPIC CHOLECYSTECTOMY;  Laparoscopic Cholecystectomy;  Surgeon: Sulma Fenton MD;  Location: [facility] OR       No family history on file.    Social History   Substance Use Topics     Smoking status: Never Smoker     Smokeless tobacco: Not on file     Alcohol use No       No Known Allergies    Physical Examination:  Vitals: /88 (BP Location: Left arm, Patient Position: Chair, Cuff Size: Adult Large)  Pulse 66  Ht 1.93 m (6' 4\")  Wt 109.9 kg (242 lb 4.8 oz)  SpO2 97%  BMI 29.49 kg/m2  BMI= Body mass index is 29.49 " kg/(m^2).    GENERAL APPEARANCE: healthy, alert and no distress  NECK: JVP is not visible  CHEST: lungs clear to auscultation - no rales, rhonchi or wheezes  CARDIOVASCULAR: Irregular rhythm  ABDOMEN: soft, non tender, without hepatosplenomegaly  EXTREMITIES: no edema    CMri 9/2016  1. Moderately dilated left ventricular size and mildly reduced systolic function with a calculated ejection fraction of 42 %.  2. Normal right ventricular size and mildly reduced systolic function with a calculated ejection fraction of 47%.   3. On delayed enhancement imaging, there is focal epicardial hyperenhancement in the basal inferolateral wall that appears less  prominent as compared to cardiac MRI 1/16.     Assessment and recommendations:  Mr. Soto is 66 year old male with a PMHx of NICM (LVEF 42%), CKD, HTN, Atrial Fibrillation on AC, PVC's s/p ablation who presents for follow up.     #Atrial Fibrillation:  -Patient has been having increasing symptoms from his atrial fibrillation. This is likely due to his chest/rib pain increasing his HR.   -At this point, it is unclear if patient is persistently in atrial fibrillation or if patient intermittently converts to sinus. This is important to distinguish as patient is on amiodarone.   -Will attempt to cardiovert patient one more time while continuing Amio 200mg daily. If patient continues to be in atrial fibrillation after cardioversion, will stop the Amiodarone and increase rate control medications.   -Continue Metoprolol 25mg BID and Diltiazem 90mg.   -Continue Warfarin. Last INR was 2.1. Will check again before cardioversion.      #NICM   -Patient's LVEF was 42% on the last MRI.   -Will repeat MRI prior to next visit. Will also assess for pericardial disease with MRI given his atypical chest pain symptoms mentioned in the HPI.     #PVC's   -EKG showed no PVC's   -Continue Amio     #Bradycardia   -HR is normal. Will assess HR after cardioversion and adjust medications  accordingly.     Estee Cho PGY-4   Cardiology Fellow       I very much appreciated the opportunity to see and assess Mr Endy Soto in the clinic with CV Fellow Dr Cho .     I agree with the note above which summarizes my findings and current recommendations    Please do not hesitate to contact my office if you have any questions or concerns.      Nelson Pérez MD  Cardiac Arrhythmia Service  [facility]  [phone]

## 2017-08-03 NOTE — MR AVS SNAPSHOT
After Visit Summary   8/3/2017    Endy Soto    MRN: [id number]           Patient Information     Date Of Birth          1950        Visit Information        Provider Department      8/3/2017 2:00 PM Nelson Pérez MD [facility]        Today's Diagnoses     Paroxysmal atrial fibrillation (H)        PVC's (premature ventricular contractions)        Atrial fibrillation status post cardioversion (H)          Care Instructions    You are scheduled for a Cardioversion at the [facility] ([address] , [facility], [phone])   On:     Below are instructions, if you have questions please contact our office.      1. You should report to the [facility] on Tuesday, August 8, 2017 at 12pm.     2. Report to the [facility] waiting room.     3. DO NOT EAT OR DRINK ANYTHING FOR 6 HOURS PRIOR TO ARRIVAL. You may have sips of water up to 2 hours prior to arrival.      4. The morning of your procedure you may take any scheduled medications with a SIP of water unless otherwise instructed.     5. THE MORNING OF YOUR PROCEDURE DO NOT TAKE:     6. You will receive medication that makes you sleepy, so you need to have someone drive you home and stay with you for 24 hours following this procedure.    You should not make any legal decisions for 24 hours following discharge.       If you have any questions regarding to your visit please contact your care team:     Cardiology  Telephone Number    Daphnie Aguila [phone]   Or send a message to your provider via my chart.   For scheduling appts or procedures:    Sheela Franklin     [phone] or  [phone]   For the Device Clinic (Pacemakers and ICD's)   RN's :   Sandi Morgan  During business hours: [phone]    After business hours:   [phone]- select option 4 and ask for job code [phone].    You can also contact us using My Chart. If you need assistance in setting this up,  please contact our office or ask at your follow up visit.     If you need a medication refill please contact your pharmacy. Please allow 3 business days for your refill to be completed.     As always, Thank you for trusting us with your health care needs!          Follow-ups after your visit        Follow-up notes from your care team     Return for Daphnie to scheduled after cardiovesion on 8/8.      Your next 10 appointments already scheduled     Aug 08, 2017  1:00 PM CDT   Cardioversion with [id number]   [facility],  Echocardiography ([facility])    [address] [address]   [phone]           1) NPO for 6 hours prior to the procedure except for sips of water with medications.  2) Hold insulin or oral diabetic medications morning of procedure. May take   dose long acting insulin. Follow further instructions of PMD.  3) Continue daily Coumadin. ~ If taking Digoxin, hold AM of procedure. ~  required. ~ A responsible adult must stay with you for 24 hours after the test.             Aug 08, 2017   Procedure with GENERIC ANESTHESIA PROVIDER   [facility], Same Day Surgery (--)    [address] [address]   [phone]            Nov 02, 2017 11:00 AM CDT   MR MYOCARDIUM W CONTRAST with UUMR4, UU CV MR NURSE   [facility], MRI ([facility])    [address] [address]   [phone]           Take your medicines as usual, unless your doctor tells you not to. Bring a list of your current medicines to your exam (including vitamins, minerals and over-the-counter drugs).  You will be given intravenous contrast for this exam. To prepare:   The day before your exam, drink extra fluids at least six 8-ounce glasses (unless your doctor tells you to restrict your fluids).   Have a blood test (creatinine test) within 30 days of your exam. Go to your clinic or Diagnostic  Imaging Department for this test.  The MRI machine uses a strong magnet. Please wear clothes without metal (snaps, zippers). A sweatsuit works well, or we may give you a hospital gown.  Please remove any body piercings and hair extensions before you arrive. You will also remove watches, jewelry, hairpins, wallets, dentures, partial dental plates and hearing aids. You may wear contact lenses, and you may be able to wear your rings. We have a safe place to keep your personal items, but it is safer to leave them at home.   **IMPORTANT** THE INSTRUCTIONS BELOW ARE ONLY FOR THOSE PATIENTS WHO HAVE BEEN TOLD THEY WILL RECEIVE SEDATION OR GENERAL ANESTHESIA DURING THEIR MRI PROCEDURE:  IF YOU WILL RECEIVE SEDATION (take medicine to help you relax during your exam):   You must get the medicine from your doctor before you arrive. Bring the medicine to the exam. Do not take it at home.   Arrive one hour early. Bring someone who can take you home after the test. Your medicine will make you sleepy. After the exam, you may not drive, take a bus or take a taxi by yourself.   No eating 8 hours before your exam. You may have clear liquids up until 4 hours before your exam. (Clear liquids include water, clear tea, black coffee and fruit juice without pulp.)  IF YOU WILL RECEIVE ANESTHESIA (be asleep for your exam):   Arrive 1 1/2 hours early. Bring someone who can take you home after the test. You may not drive, take a bus or take a taxi by yourself.   No eating 8 hours before your exam. You may have clear liquids up until 4 hours before your exam. (Clear liquids include water, clear tea, black coffee and fruit juice without pulp.)  Please call the Imaging Department at your exam site with any questions.              Who to contact     If you have questions or need follow up information about today's clinic visit or your schedule please contact [facility] directly at [phone].  Normal or non-critical lab and imaging  "results will be communicated to you by MyChart, letter or phone within 4 business days after the clinic has received the results. If you do not hear from us within 7 days, please contact the clinic through [employer] or phone. If you have a critical or abnormal lab result, we will notify you by phone as soon as possible.  Submit refill requests through [employer] or call your pharmacy and they will forward the refill request to us. Please allow 3 business days for your refill to be completed.          Additional Information About Your Visit        [employer] Information     [employer] gives you secure access to your electronic health record. If you see a primary care provider, you can also send messages to your care team and make appointments. If you have questions, please call your primary care clinic.  If you do not have a primary care provider, please call [phone] and they will assist you.        Care EveryWhere ID     This is your Care EveryWhere ID. This could be used by other organizations to access your [place] medical records  [id number]        Your Vitals Were     Pulse Height Pulse Oximetry BMI (Body Mass Index)          66 1.93 m (6' 4\") 97% 29.49 kg/m2         Blood Pressure from Last 3 Encounters:   08/03/17 137/88   04/27/17 140/81   09/29/16 148/87    Weight from Last 3 Encounters:   08/03/17 109.9 kg (242 lb 4.8 oz)   04/27/17 110.5 kg (243 lb 11.2 oz)   09/29/16 106.6 kg (235 lb)              We Performed the Following     EKG 12-lead, tracing only (Future)          Today's Medication Changes          These changes are accurate as of: 8/3/17  4:27 PM.  If you have any questions, ask your nurse or doctor.               These medicines have changed or have updated prescriptions.        Dose/Directions    amiodarone 200 MG tablet   Commonly known as:  PACERONE/CODARONE   This may have changed:  when to take this   Used for:  Paroxysmal atrial fibrillation (H)   Changed by:  Nelson Pérez MD        " Dose:  200 mg   Take 1 tablet (200 mg) by mouth daily   Quantity:  93 tablet   Refills:  3       diltiazem 90 MG tablet   Commonly known as:  CARDIZEM   This may have changed:  when to take this   Used for:  Atrial fibrillation status post cardioversion (H)   Changed by:  Nelson Pérez MD        Dose:  90 mg   Take 1 tablet (90 mg) by mouth daily In am   Refills:  0       metoprolol 25 MG 24 hr tablet   Commonly known as:  TOPROL-XL   This may have changed:  additional instructions   Used for:  Paroxysmal atrial fibrillation (H)   Changed by:  Nelson Pérez MD        Dose:  25 mg   Take 1 tablet (25 mg) by mouth daily 2 po daily( 50 mg daily)   Refills:  0       warfarin 5 MG tablet   Commonly known as:  COUMADIN   This may have changed:  additional instructions   Used for:  Paroxysmal atrial fibrillation (H)        Dose:  5-7.5 mg   Take 1-1.5 tablets (5-7.5 mg) by mouth daily   Quantity:  130 tablet   Refills:  3            Where to get your medicines      Some of these will need a paper prescription and others can be bought over the counter.  Ask your nurse if you have questions.     You don't need a prescription for these medications     amiodarone 200 MG tablet    diltiazem 90 MG tablet    metoprolol 25 MG 24 hr tablet    warfarin 5 MG tablet                Primary Care Provider Office Phone # Fax #    Marko QUINTANA Markus [phone]       [facility] AND [place] [id number]        Equal Access to Services     TIM[facility] : Hadii aad ku hadasho Soomaali, waaxda luqadaha, qaybta kaalmada adeegyada, abeba kathleen . So [facility] [phone].    ATENCIÓN: Si habla español, tiene a davey disposición servicios gratuitos de asistencia lingüística. Freda al [phone].    We comply with applicable federal civil rights laws and [place] laws. We do not discriminate on the basis of race, color, national origin, age, disability sex, sexual  orientation or gender identity.            Thank you!     Thank you for choosing [facility]  for your care. Our goal is always to provide you with excellent care. Hearing back from our patients is one way we can continue to improve our services. Please take a few minutes to complete the written survey that you may receive in the mail after your visit with us. Thank you!             Your Updated Medication List - Protect others around you: Learn how to safely use, store and throw away your medicines at www.disposemymeds.org.          This list is accurate as of: 8/3/17  4:27 PM.  Always use your most recent med list.                   Brand Name Dispense Instructions for use Diagnosis    amiodarone 200 MG tablet    PACERONE/CODARONE    93 tablet    Take 1 tablet (200 mg) by mouth daily    Paroxysmal atrial fibrillation (H)       co-enzyme Q-10 100 MG Caps capsule      Take  by mouth daily. 1 caps    PVC (premature ventricular contraction), Chest pain       diltiazem 90 MG tablet    CARDIZEM     Take 1 tablet (90 mg) by mouth daily In am    Atrial fibrillation status post cardioversion (H)       fish oil-omega-3 fatty acids 1000 MG capsule      Take 1 g by mouth daily.        lisinopril 20 MG tablet    PRINIVIL/ZESTRIL    93 tablet    Take 1 tablet (20 mg) by mouth daily In am    HTN (hypertension)       metoprolol 25 MG 24 hr tablet    TOPROL-XL     Take 1 tablet (25 mg) by mouth daily 2 po daily( 50 mg daily)    Paroxysmal atrial fibrillation (H)       MULTI VITAMIN MENS PO      Take  by mouth. 1 tablet daily    PVC (premature ventricular contraction), Chest pain       PREDNISONE PO      Take 10 mg by mouth        warfarin 5 MG tablet    COUMADIN    130 tablet    Take 1-1.5 tablets (5-7.5 mg) by mouth daily    Paroxysmal atrial fibrillation (H)

## 2017-08-03 NOTE — TELEPHONE ENCOUNTER
Clinical Cardiac Electrophysiology    Chief Complaint: follow up for PVCs, PAF.    HPI: Mr. Soto is a 66 year old male who has a past medical history significant for of NICM (LVEF 40-45%), CKD, HTN, Atrial fibrillation. CHADSVASC= 2 (+HTN, +HF)     3/29/2016   He had frequent PVCs and NSVT in past. He underwent PVA RFA which appeared to eliminate one PVC morphology but a second near base of Pap muscle . Attempted RFA at that site caused Pericardial effusion requiring drainage. He has recovered well with Rx of colchicine and some prednisone. He continues on warfarin followed in [facility]. Remains active golfing and bowling without any evident limitation.     4/27/2017  Current cardiac medications include amiodarone, lisinopril, diltiazem, metoprolol, fish oil, warfarin    Since last visit, he's doing well. No chest pain, dyspnea, palpitations.     Current Outpatient Prescriptions   Medication Sig Dispense Refill     amiodarone (PACERONE/CODARONE) 200 MG tablet Take 1 tablet (200 mg) by mouth every other day 93 tablet 3     lisinopril (PRINIVIL/ZESTRIL) 20 MG tablet Take 1 tablet (20 mg) by mouth daily In am 93 tablet 3     diltiazem (CARDIZEM) 90 MG tablet Take 1 tablet (90 mg) by mouth 2 times daily In am (Patient taking differently: Take 90 mg by mouth daily In am) 90 tablet 3     warfarin (COUMADIN) 5 MG tablet Take 1-1.5 tablets (5-7.5 mg) by mouth daily (Patient taking differently: Take 5-7.5 mg by mouth daily As directed by the coumadin clinic) 130 tablet 3     metoprolol (TOPROL-XL) 25 MG 24 hr tablet Take 1 tablet (25 mg) by mouth daily 90 tablet 3     fish oil-omega-3 fatty acids (FISH OIL) 1000 MG capsule Take 1 g by mouth daily.       Multiple Vitamin (MULTI VITAMIN MENS PO) Take  by mouth. 1 tablet daily       co-enzyme Q-10 (COQ10) 100 MG CAPS Take  by mouth daily. 1 caps         Past Medical History:   Diagnosis Date     Atrial fibrillation (H)      Cardiomyopathy (H)      CKD (chronic kidney  disease)      Hypertension      Other premature beats      Palpitations        Past Surgical History:   Procedure Laterality Date     ANESTHESIA CARDIOVERSION N/A 3/17/2015    Procedure: ANESTHESIA CARDIOVERSION;  Surgeon: Generic Anesthesia Provider;  Location: [facility] OR     ANESTHESIA CARDIOVERSION N/A 3/2/2016    Procedure: ANESTHESIA CARDIOVERSION;  Surgeon: GENERIC ANESTHESIA PROVIDER;  Location: [facility] OR     EP ABLATION / EP STUDIES  02/26/16    PVC ablation w/P. effusion/tamponade     LAPAROSCOPIC CHOLECYSTECTOMY  2/25/2014    Procedure: LAPAROSCOPIC CHOLECYSTECTOMY;  Laparoscopic Cholecystectomy;  Surgeon: Sulma Fenton MD;  Location: [facility] OR       No family history on file.    Social History   Substance Use Topics     Smoking status: Never Smoker     Smokeless tobacco: Not on file     Alcohol use No       No Known Allergies    ROS:   CONSTITUTIONAL:No report of fever, chills  RESPIRATORY: No cough, wheezing, SOB, or hemoptysis  CARDIOVASCULAR: see HPI  MUSCULO-SKELETAL: No joint pain/swelling, no muslce pain  NEURO: No paresthesias, syncope, pre-syncope, light headness, dizziness or vertigo  ENDOCRINE: No temperature intolerance, no skin/hair changes  PSYCHIATRIC: No change in mood, feeling down/anxious, no change in sleep or appetite  GI: no melena or hematochezia, no change in bowel habits  : no hematuria or dysurea, no hesitancy, dribbling or incontinence  HEME: no easy bruising or bleeding, no history of anemia, no history of blood clots  SKIN: no rashes or sores, no unusual itching. Some facial flushing    Physical Examination:  Vitals: There were no vitals taken for this visit.  BMI= There is no height or weight on file to calculate BMI.    GENERAL APPEARANCE: healthy, alert and no distress  NECK: JVP is not visible  CHEST: lungs clear to auscultation - no rales, rhonchi or wheezes  CARDIOVASCULAR: regular rhythm, normal S1 with physiologic split S2, no S3 or S4 and no murmur  ABDOMEN: soft,  non tender, without hepatosplenomegaly  EXTREMITIES: no edema  NEURO: alert and oriented to person/place/time, normal speech, gait and affect  SKIN: no ecchymoses, no rashes    CMri 9/2016  1. Moderately dilated left ventricular size and mildly reduced systolic function with a calculated ejection fraction of 42 %.  2. Normal right ventricular size and mildly reduced systolic function with a calculated ejection fraction of 47%.   3. On delayed enhancement imaging, there is focal epicardial hyperenhancement in the basal inferolateral wall that appears less  prominent as compared to cardiac MRI 1/16.     Assessment and recommendations:  Mr. Soto is a 66 year old male who has a past medical history significant for of NICM (LVEF 40-45%), CKD, HTN, and Atrial fibrillation. CHADSVASC= 2 (+HTN, +HF).    1. NICM -  clinically stable. LVEF 42% on last MRI. Plan to repeat MRI prior to next visit.     2. PVC's - patient's ECG today shows he is in sinus rhythm PVC having RBBB morphology with LAD present. Consistent with PVC #2 during EPS.     3. Bradycardia - amio 100 daily    4. Atrial fibrillation - continue warfarin    I appreciate the chance to help with Mr. Soto care. Please let me know if you have any questions or concerns.    I very much appreciated the opportunity to see and assess Mr Endy Soto in the clinic with Diana Cedeno CNA. I spent approx 20 min with patient and spouse reviweing history and providing recommendations. Today I reviewed his ECG which showed occas PVC of single morphology.  He denied any CP, SOBE or exertional intolerance. He did note some facial flushing that may be amio related. I advised his wearing hat whenever exposed to sun.  Amio labs reviewed and OK.  Please do not hesitate to contact my office if you have any questions or concerns.      Nelson Pérez MD  Cardiac Arrhythmia Service  [facility]  [phone]

## 2017-08-03 NOTE — LETTER
8/3/2017      RE: Endy Soto  [address] [facility] [address]       Dear Colleague,    Thank you for the opportunity to participate in the care of your patient, Endy Soto, at the [facility] HEART [facility]. Please see a copy of my visit note below.        Clinical Cardiac Electrophysiology    Chief Complaint: follow up for PVCs, PAF.    HPI: Mr. oSto is 66 year old male with a PMHx of NICM (LVEF 42%), CKD, HTN, Atrial Fibrillation on AC, PVC's s/p ablation who presents for follow up.     Patient had a PVC ablation in 2016 that was complicated by pericardial tamponade. After drainage, patient was in atrial fibrillation with RVR. He was cardioverted with good results for around a year. He was on Amio, with good maintenance of sinus rhythm.     However, recently, patient has had several episodes where he felt more symptomatic with his atrial fibrillation. These episodes usually occur when he is having a non-specific chest/rib pain. The chest pain is worse upon palpitation and sometimes patient can not lay on that side. He denies positional changes or pain during inspiration. When this pain is severe, he notes that he is in atrial fibrillation. His symptoms include chest tightness, nausea, and shortness of breath.     Patient has seen his PCP for this pain. He was prescribed steroids which completely resolved the pain. However, when he stops the steroids, the pain can recur and his symptoms of atrial fibrillation return. He was most recently prescribed steroids two days ago with again improvement in his symptoms.     Overall, the story is unclear if patient is in persistent atrial fibrillation or going in and out of atrial fibrillation with this chest pain.     He is currently on  amiodarone 200mg daily, Diltiazem 90mg in the AM, and Metoprolol 25mg BID.     Patient denies chest pain, orthopnea, dizziness, LH, syncope.       Current Outpatient Prescriptions    Medication Sig Dispense Refill     PREDNISONE PO Take 10 mg by mouth        amiodarone (PACERONE/CODARONE) 200 MG tablet Take 1 tablet (200 mg) by mouth every other day 93 tablet 3     lisinopril (PRINIVIL/ZESTRIL) 20 MG tablet Take 1 tablet (20 mg) by mouth daily In am 93 tablet 3     diltiazem (CARDIZEM) 90 MG tablet Take 1 tablet (90 mg) by mouth 2 times daily In am (Patient taking differently: Take 90 mg by mouth daily In am) 90 tablet 3     warfarin (COUMADIN) 5 MG tablet Take 1-1.5 tablets (5-7.5 mg) by mouth daily (Patient taking differently: Take 5-7.5 mg by mouth daily As directed by the coumadin clinic) 130 tablet 3     metoprolol (TOPROL-XL) 25 MG 24 hr tablet Take 1 tablet (25 mg) by mouth daily (Patient taking differently: Take 25 mg by mouth daily 2 po daily( 50 mg daily)) 90 tablet 3     fish oil-omega-3 fatty acids (FISH OIL) 1000 MG capsule Take 1 g by mouth daily.       Multiple Vitamin (MULTI VITAMIN MENS PO) Take  by mouth. 1 tablet daily       co-enzyme Q-10 (COQ10) 100 MG CAPS Take  by mouth daily. 1 caps         Past Medical History:   Diagnosis Date     Atrial fibrillation (H)      Cardiomyopathy (H)      CKD (chronic kidney disease)      Hypertension      Other premature beats      Palpitations        Past Surgical History:   Procedure Laterality Date     ANESTHESIA CARDIOVERSION N/A 3/17/2015    Procedure: ANESTHESIA CARDIOVERSION;  Surgeon: Generic Anesthesia Provider;  Location: [facility] OR     ANESTHESIA CARDIOVERSION N/A 3/2/2016    Procedure: ANESTHESIA CARDIOVERSION;  Surgeon: GENERIC ANESTHESIA PROVIDER;  Location:  OR     EP ABLATION / EP STUDIES  02/26/16    PVC ablation w/P. effusion/tamponade     LAPAROSCOPIC CHOLECYSTECTOMY  2/25/2014    Procedure: LAPAROSCOPIC CHOLECYSTECTOMY;  Laparoscopic Cholecystectomy;  Surgeon: Sulma Fenton MD;  Location:  OR       No family history on file.    Social History   Substance Use Topics     Smoking status: Never Smoker      "Smokeless tobacco: Not on file     Alcohol use No       No Known Allergies    Physical Examination:  Vitals: /88 (BP Location: Left arm, Patient Position: Chair, Cuff Size: Adult Large)  Pulse 66  Ht 1.93 m (6' 4\")  Wt 109.9 kg (242 lb 4.8 oz)  SpO2 97%  BMI 29.49 kg/m2  BMI= Body mass index is 29.49 kg/(m^2).    GENERAL APPEARANCE: healthy, alert and no distress  NECK: JVP is not visible  CHEST: lungs clear to auscultation - no rales, rhonchi or wheezes  CARDIOVASCULAR: Irregular rhythm  ABDOMEN: soft, non tender, without hepatosplenomegaly  EXTREMITIES: no edema    CMri 9/2016  1. Moderately dilated left ventricular size and mildly reduced systolic function with a calculated ejection fraction of 42 %.  2. Normal right ventricular size and mildly reduced systolic function with a calculated ejection fraction of 47%.   3. On delayed enhancement imaging, there is focal epicardial hyperenhancement in the basal inferolateral wall that appears less  prominent as compared to cardiac MRI 1/16.     Assessment and recommendations:  Mr. Soto is 66 year old male with a PMHx of NICM (LVEF 42%), CKD, HTN, Atrial Fibrillation on AC, PVC's s/p ablation who presents for follow up.     #Atrial Fibrillation:  -Patient has been having increasing symptoms from his atrial fibrillation. This is likely due to his chest/rib pain increasing his HR.   -At this point, it is unclear if patient is persistently in atrial fibrillation or if patient intermittently converts to sinus. This is important to distinguish as patient is on amiodarone.   -Will attempt to cardiovert patient one more time while continuing Amio 200mg daily. If patient continues to be in atrial fibrillation after cardioversion, will stop the Amiodarone and increase rate control medications.   -Continue Metoprolol 25mg BID and Diltiazem 90mg.   -Continue Warfarin. Last INR was 2.1. Will check again before cardioversion.      #NICM   -Patient's LVEF was 42% on the " last MRI.   -Will repeat MRI prior to next visit. Will also assess for pericardial disease with MRI given his atypical chest pain symptoms mentioned in the HPI.     #PVC's   -EKG showed no PVC's   -Continue Amio     #Bradycardia   -HR is normal. Will assess HR after cardioversion and adjust medications accordingly.     Estee Cho PGY-4   Cardiology Fellow       I very much appreciated the opportunity to see and assess Mr Endy Soto in the clinic with CV Fellow Dr Cho .     I agree with the note above which summarizes my findings and current recommendations    Please do not hesitate to contact my office if you have any questions or concerns.      Nelson Pérez MD  Cardiac Arrhythmia Service  [facility]  [phone]

## 2017-08-03 NOTE — PATIENT INSTRUCTIONS
You are scheduled for a Cardioversion at the [facility] ([address], [place], [phone])   On:     Below are instructions, if you have questions please contact our office.      1. You should report to the [facility] on Tuesday, August 8, 2017 at 12pm.     2. Report to the [facility] waiting room.     3. DO NOT EAT OR DRINK ANYTHING FOR 6 HOURS PRIOR TO ARRIVAL. You may have sips of water up to 2 hours prior to arrival.      4. The morning of your procedure you may take any scheduled medications with a SIP of water unless otherwise instructed.     5. THE MORNING OF YOUR PROCEDURE DO NOT TAKE:     6. You will receive medication that makes you sleepy, so you need to have someone drive you home and stay with you for 24 hours following this procedure.    You should not make any legal decisions for 24 hours following discharge.       If you have any questions regarding to your visit please contact your care team:     Cardiology  Telephone Number    Daphnie Aguila -[phone]   Or send a message to your provider via my chart.   For scheduling appts or procedures:    Sheela Franklin     [phone] or  [phone]   For the Device Clinic (Pacemakers and ICD's)   RN's :   Sandi Morgan  During business hours: [phone]    After business hours:   [phone]- select option 4 and ask for job code [phone].    You can also contact us using My Chart. If you need assistance in setting this up, please contact our office or ask at your follow up visit.     If you need a medication refill please contact your pharmacy. Please allow 3 business days for your refill to be completed.     As always, Thank you for trusting us with your health care needs!

## 2017-08-04 LAB — INTERPRETATION ECG - MUSE: NORMAL

## 2017-08-08 ENCOUNTER — APPOINTMENT (OUTPATIENT)
Dept: CARDIOLOGY | Facility: CLINIC | Age: 67
End: 2017-08-08
Attending: NURSE PRACTITIONER
Payer: MEDICARE

## 2017-08-08 ENCOUNTER — HOSPITAL ENCOUNTER (OUTPATIENT)
Facility: CLINIC | Age: 67
Discharge: HOME | End: 2017-08-08
Attending: INTERNAL MEDICINE | Admitting: INTERNAL MEDICINE
Payer: MEDICARE

## 2017-08-08 ENCOUNTER — APPOINTMENT (OUTPATIENT)
Dept: MEDSURG UNIT | Facility: CLINIC | Age: 67
End: 2017-08-08
Attending: INTERNAL MEDICINE
Payer: MEDICARE

## 2017-08-08 ENCOUNTER — APPOINTMENT (OUTPATIENT)
Dept: CARDIOLOGY | Facility: CLINIC | Age: 67
End: 2017-08-08
Attending: INTERNAL MEDICINE
Payer: MEDICARE

## 2017-08-08 ENCOUNTER — ANESTHESIA (OUTPATIENT)
Dept: SURGERY | Facility: CLINIC | Age: 67
End: 2017-08-08
Payer: MEDICARE

## 2017-08-08 ENCOUNTER — HOSPITAL ENCOUNTER (OUTPATIENT)
Facility: CLINIC | Age: 67
End: 2017-08-08
Attending: INTERNAL MEDICINE | Admitting: INTERNAL MEDICINE
Payer: MEDICARE

## 2017-08-08 ENCOUNTER — ANESTHESIA EVENT (OUTPATIENT)
Dept: SURGERY | Facility: CLINIC | Age: 67
End: 2017-08-08
Payer: MEDICARE

## 2017-08-08 ENCOUNTER — SURGERY (OUTPATIENT)
Age: 67
End: 2017-08-08

## 2017-08-08 VITALS
TEMPERATURE: 97.7 F | RESPIRATION RATE: 16 BRPM | SYSTOLIC BLOOD PRESSURE: 138 MMHG | HEART RATE: 54 BPM | BODY MASS INDEX: 28.86 KG/M2 | WEIGHT: 237 LBS | OXYGEN SATURATION: 97 % | DIASTOLIC BLOOD PRESSURE: 74 MMHG | HEIGHT: 76 IN

## 2017-08-08 DIAGNOSIS — I48.0 PAROXYSMAL ATRIAL FIBRILLATION (H): ICD-10-CM

## 2017-08-08 DIAGNOSIS — I49.3 PVC'S (PREMATURE VENTRICULAR CONTRACTIONS): ICD-10-CM

## 2017-08-08 LAB
ANION GAP SERPL CALCULATED.3IONS-SCNC: 4 MMOL/L (ref 3–14)
BUN SERPL-MCNC: 27 MG/DL (ref 7–30)
CALCIUM SERPL-MCNC: 9.9 MG/DL (ref 8.5–10.1)
CHLORIDE SERPL-SCNC: 106 MMOL/L (ref 94–109)
CO2 SERPL-SCNC: 28 MMOL/L (ref 20–32)
CREAT SERPL-MCNC: 1.5 MG/DL (ref 0.66–1.25)
GFR SERPL CREATININE-BSD FRML MDRD: 47 ML/MIN/1.7M2
GLUCOSE SERPL-MCNC: 104 MG/DL (ref 70–99)
INR PPP: 2.05 (ref 0.86–1.14)
MAGNESIUM SERPL-MCNC: 2.3 MG/DL (ref 1.6–2.3)
POTASSIUM SERPL-SCNC: 4.4 MMOL/L (ref 3.4–5.3)
SODIUM SERPL-SCNC: 138 MMOL/L (ref 133–144)

## 2017-08-08 PROCEDURE — 93005 ELECTROCARDIOGRAM TRACING: CPT

## 2017-08-08 PROCEDURE — 40000166 ZZH STATISTIC PP CARE STAGE 1

## 2017-08-08 PROCEDURE — 93005 ELECTROCARDIOGRAM TRACING: CPT | Mod: XU

## 2017-08-08 PROCEDURE — 92960 CARDIOVERSION ELECTRIC EXT: CPT | Performed by: INTERNAL MEDICINE

## 2017-08-08 PROCEDURE — 37000008 ZZH ANESTHESIA TECHNICAL FEE, 1ST 30 MIN

## 2017-08-08 PROCEDURE — 92960 CARDIOVERSION ELECTRIC EXT: CPT

## 2017-08-08 PROCEDURE — 85610 PROTHROMBIN TIME: CPT | Performed by: INTERNAL MEDICINE

## 2017-08-08 PROCEDURE — 80048 BASIC METABOLIC PNL TOTAL CA: CPT | Performed by: INTERNAL MEDICINE

## 2017-08-08 PROCEDURE — 25000125 ZZHC RX 250: Performed by: NURSE ANESTHETIST, CERTIFIED REGISTERED

## 2017-08-08 PROCEDURE — 83735 ASSAY OF MAGNESIUM: CPT | Performed by: INTERNAL MEDICINE

## 2017-08-08 RX ADMIN — METHOHEXITAL SODIUM 50 MG: 500 INJECTION, POWDER, LYOPHILIZED, FOR SOLUTION INTRAMUSCULAR; INTRAVENOUS; RECTAL at 14:11

## 2017-08-08 RX ADMIN — METHOHEXITAL SODIUM 30 MG: 500 INJECTION, POWDER, LYOPHILIZED, FOR SOLUTION INTRAMUSCULAR; INTRAVENOUS; RECTAL at 14:12

## 2017-08-08 ASSESSMENT — ENCOUNTER SYMPTOMS: DYSRHYTHMIAS: 1

## 2017-08-08 NOTE — PROGRESS NOTES
Patient tolerated recovery stage well. VSS, denies pain. Patient tolerated PO food and fluids. Teaching was done and discharge instructions were given. Patient ambulated, voided, and PIV was removed. Patient discharged from the hospital via wheel chair to home with his wife & daughter.

## 2017-08-08 NOTE — ANESTHESIA PREPROCEDURE EVALUATION
Anesthesia Evaluation     . Pt has had prior anesthetic. Type: General    No history of anesthetic complications          ROS/MED HX    ENT/Pulmonary:       Neurologic:       Cardiovascular:     (+) hypertension----. : . . . :. dysrhythmias a-fib, .       METS/Exercise Tolerance:     Hematologic:         Musculoskeletal:         GI/Hepatic:     (+) GERD       Renal/Genitourinary:     (+) chronic renal disease, type: CRI,       Endo:         Psychiatric:         Infectious Disease:         Malignancy:         Other:                     Physical Exam  Normal systems: cardiovascular, pulmonary and dental    Airway   Mallampati: III  TM distance: >3 FB  Neck ROM: full    Dental     Cardiovascular       Pulmonary                     Anesthesia Plan      History & Physical Review  History and physical reviewed and following examination; no interval change.    ASA Status:  3 .    NPO Status:  > 8 hours    Plan for General with Intravenous induction. Maintenance will be TIVA.           Postoperative Care      Consents  Anesthetic plan, risks, benefits and alternatives discussed with:  Patient.  Use of blood products discussed: No .   .            ANESTHESIA PREOP EVALUATION    NPO Status:      Procedure: cardioversion    HPI: afib and nonischemic cardiomyopathy     PMHx/PSHx/ROS:  PAST MEDICAL HISTORY:   Past Medical History:   Diagnosis Date     Atrial fibrillation (H)      Cardiomyopathy (H)      CKD (chronic kidney disease)      Hypertension      Other premature beats      Palpitations        PAST SURGICAL HISTORY:   Past Surgical History:   Procedure Laterality Date     ANESTHESIA CARDIOVERSION N/A 3/17/2015    Procedure: ANESTHESIA CARDIOVERSION;  Surgeon: Generic Anesthesia Provider;  Location: [facility] OR     ANESTHESIA CARDIOVERSION N/A 3/2/2016    Procedure: ANESTHESIA CARDIOVERSION;  Surgeon: GENERIC ANESTHESIA PROVIDER;  Location: [facility] OR     EP ABLATION / EP STUDIES  02/26/16    PVC ablation w/P. effusion/tamponade      LAPAROSCOPIC CHOLECYSTECTOMY  2/25/2014    Procedure: LAPAROSCOPIC CHOLECYSTECTOMY;  Laparoscopic Cholecystectomy;  Surgeon: Sulma Fenton MD;  [facility]       FAMILY HISTORY: No family history on file.      Past Anes Hx: No personal or family h/o anesthesia problems    Soc Hx:   Tobacco: no  EtOH: no    Allergies: No Known Allergies    Meds:   Prescriptions Prior to Admission   Medication Sig Dispense Refill Last Dose     PREDNISONE PO Take 10 mg by mouth    Taking     amiodarone (PACERONE/CODARONE) 200 MG tablet Take 1 tablet (200 mg) by mouth daily 93 tablet 3      diltiazem (CARDIZEM) 90 MG tablet Take 1 tablet (90 mg) by mouth daily In am        metoprolol (TOPROL-XL) 25 MG 24 hr tablet Take 1 tablet (25 mg) by mouth daily 2 po daily( 50 mg daily)        warfarin (COUMADIN) 5 MG tablet Take 1-1.5 tablets (5-7.5 mg) by mouth daily 130 tablet 3      lisinopril (PRINIVIL/ZESTRIL) 20 MG tablet Take 1 tablet (20 mg) by mouth daily In am 93 tablet 3 Taking     fish oil-omega-3 fatty acids (FISH OIL) 1000 MG capsule Take 1 g by mouth daily.   Taking     Multiple Vitamin (MULTI VITAMIN MENS PO) Take  by mouth. 1 tablet daily   Taking     co-enzyme Q-10 (COQ10) 100 MG CAPS Take  by mouth daily. 1 caps   Taking       No current outpatient prescriptions on file.       Physical Exam:  VS: T Data Unavailable, P Data Unavailable, BP Data Unavailable, R Data Unavailable, SpO2       Airway: MP   Dentition: no loose teeth  Heart:   Lungs:      BMP:  Lab Results   Component Value Date     04/27/2017      Lab Results   Component Value Date    POTASSIUM 4.2 04/27/2017     Lab Results   Component Value Date    CHLORIDE 107 04/27/2017     Lab Results   Component Value Date    STEPHEN 9.5 04/27/2017     Lab Results   Component Value Date    CO2 27 04/27/2017     Lab Results   Component Value Date    BUN 18 04/27/2017     Lab Results   Component Value Date    CR 1.42 04/27/2017     Lab Results   Component  Value Date    GLC 85 04/27/2017        CBC:  Lab Results   Component Value Date    WBC 7.1 04/27/2017     Lab Results   Component Value Date    HGB 15.7 04/27/2017     Lab Results   Component Value Date    HCT 47.2 04/27/2017     Lab Results   Component Value Date     04/27/2017        Coags/Type and Screen  Lab Results   Component Value Date    INR 1.08 03/02/2016     No results found for: PT  Type and Screen:      Assessment/Plan:  - ASA 3  - GETA with standard ASA monitors, IV induction, balanced anesthetic  - PIV    Francisco Aparicio M.D.    8/8/2017  12:31 PM                    .

## 2017-08-08 NOTE — ANESTHESIA POSTPROCEDURE EVALUATION
Patient: Endy Soto    Procedure(s):  Anesthesia Offsite Cardioversion @1330    Diagnosis:Atrial Fibrillation   Diagnosis Additional Information: No value filed.    Anesthesia Type:  General    Note:  Anesthesia Post Evaluation    Patient location during evaluation: Echo Lab  Patient participation: Able to fully participate in evaluation  Level of consciousness: awake  Pain management: adequate  Airway patency: patent  Cardiovascular status: acceptable  Respiratory status: acceptable  Hydration status: acceptable  PONV: none     Anesthetic complications: None          Last vitals:  Vitals:    08/08/17 1423 08/08/17 1426 08/08/17 1434   BP: (!) 140/94 140/88 120/75   Pulse: 60 55 55   Resp: 14 16 16   Temp:      SpO2: 99% 98% 95%         Electronically Signed By: Francisco Aparicio MD  August 8, 2017  3:01 PM

## 2017-08-08 NOTE — ANESTHESIA CARE TRANSFER NOTE
Patient: Endy Soto    Procedure(s):  Anesthesia Offsite Cardioversion @1330    Diagnosis: Atrial Fibrillation   Diagnosis Additional Information: No value filed.    Anesthesia Type:   General     Note:  Airway :Nasal Cannula  Patient transferred to:Phase II  Comments: Awake with good patent airway.  VSS      Vitals: (Last set prior to Anesthesia Care Transfer)    CRNA VITALS  8/8/2017 1410 - 8/8/2017 1440      8/8/2017             Resp Rate (observed): 14                Electronically Signed By: DAVID Camargo CRNA  August 8, 2017  2:40 PM

## 2017-08-08 NOTE — PROGRESS NOTES
Pt arrived in ECHO department  for scheduled DCCV.   Procedure explained, questions answered and consent signed. Discharge instructions discussed with patient.  Anesthesia gave pt 80 mg IV brevitol for sedation and pt was DCCV at 150 Joules to a SR.  Pt transferred back to unit 2A after awake and VSS  Report given to Sandi CRAIG.

## 2017-08-08 NOTE — PROGRESS NOTES
Prepped for CV.  Denies pain.  H & P current.  Labs pending.  Needs consent.  Family with patient.

## 2017-08-08 NOTE — PROGRESS NOTES
Pt back from ECHO s/p cardioversion.  VSS.  Pt alert and oriented x4.  Pt denies any pain.  Skin on chest and back intact where cardioversion pads placed.  Pt's family at the bedside.

## 2017-08-09 LAB
INTERPRETATION ECG - MUSE: NORMAL
INTERPRETATION ECG - MUSE: NORMAL

## 2017-08-10 ENCOUNTER — CARE COORDINATION (OUTPATIENT)
Dept: CARDIOLOGY | Facility: CLINIC | Age: 67
End: 2017-08-10

## 2017-08-10 DIAGNOSIS — I48.0 PAROXYSMAL ATRIAL FIBRILLATION (H): ICD-10-CM

## 2017-08-10 RX ORDER — METOPROLOL SUCCINATE 25 MG/1
25 TABLET, EXTENDED RELEASE ORAL DAILY
Qty: 30 TABLET
Start: 2017-08-10

## 2017-08-10 NOTE — PROGRESS NOTES
Late entry:     Spoke with pt yesterday: stated he is doing well, still in regular rhythm, concerned that his heart rate in 50's- feeling a little tired    Date: 8/10/2017    Time of Call: 5:27 AM     Diagnosis:  PAF, s/p cardioversion, bradycardia, toprol xl had been increased prior to cardioversion from 25 mg daily to 50 mg daily     TORB: Ordering provider: Dr Pérez  Order:decrease toprol xl to 25 mg daily  Continue amiodarone 200 mg daily     Order received by: Daphnie Aguila RN     Follow-up/additional notes: Pt verbalized understanding, EMR updated

## 2017-08-18 NOTE — ADDENDUM NOTE
Encounter addended by: Nelson Pérez MD on: 8/18/2017  2:32 PM<BR>     Actions taken: Charge Capture section accepted

## 2017-08-18 NOTE — ADDENDUM NOTE
Encounter addended by: Nikki Jenkins APRN CNP on: 8/17/2017  9:21 PM<BR>     Actions taken: Sign clinical note

## 2017-08-18 NOTE — OP NOTE
CARDIOVERSION    PROCEDURE:  direct current cardioversion  PROCEDURE DATE: 8/8/2017     Pre-procedure diagnosis:  Atrial fibrillation  Post-procedure diagnosis: s/p direct current cardioversion  Complications:  none    BRIEF CLINICAL HISTORY:  Mr. Soto is a 64 year old male who has a past medical history significant for of NICM (LVEF 40-45%), CKD, HTN, and  Atrial fibrillation (CHADSVASC 2 on Warfarin). He was found to be back in AF, and reported fatigue and RIOS. He was arranged for DCCV.      PROCEDURE:  The patient arrived at the Echo Laboratory in a fasting, non-sedated state.  Informed consent was previously obtained from patient, who understood indications, risks, and benefits of the procedure.  INR > 2.0 was verified for at least the last month. With help from Anesthesia, patient was deeply sedated.  150J of synchronized biphasic shock was delivered through the cardiac monitor, and the patient was successfully converted to normal sinus rhythm.  After sedation wore off, patient awoke and remained neurologically intact.  The patient tolerated the procedure well from a hemodynamic and respiratory standpoint without any complications.  He was observed in the Echo Laboratory and then discharged to home after sedation wore off and he was ambulatory.    IMPRESSION:  1.  Successful direct current cardioversion with 150J biphasic shock.    RECOMMENDATIONS/PLANS:  1.   Follow-up with Dr. Pérez  2.   Continue warfarin      I appreciated the opportunity to see and assess Mr Soto and direct the procedure described above with Nikki Jenkins CNP. The above note summarizes my findings and current recommendations. Please do not hesitate to contact me if you have any questions or concerns.    Nelson Pérez MD [facility]   Pager [phone]  Office [phone]

## 2017-09-10 DIAGNOSIS — I48.0 PAROXYSMAL ATRIAL FIBRILLATION (H): ICD-10-CM

## 2017-09-10 DIAGNOSIS — I48.91 ATRIAL FIBRILLATION STATUS POST CARDIOVERSION (H): ICD-10-CM

## 2017-09-11 RX ORDER — DILTIAZEM HCL 90 MG
TABLET ORAL
Qty: 90 TABLET | Refills: 3 | Status: SHIPPED | OUTPATIENT
Start: 2017-09-11 | End: 2017-11-29

## 2017-09-11 RX ORDER — WARFARIN SODIUM 5 MG/1
TABLET ORAL
Qty: 130 TABLET | Refills: 3 | Status: SHIPPED | OUTPATIENT
Start: 2017-09-11 | End: 2018-06-22

## 2017-10-05 ENCOUNTER — CARE COORDINATION (OUTPATIENT)
Dept: CARDIOLOGY | Facility: CLINIC | Age: 67
End: 2017-10-05

## 2017-10-05 DIAGNOSIS — I48.0 PAROXYSMAL ATRIAL FIBRILLATION (H): ICD-10-CM

## 2017-10-05 RX ORDER — AMIODARONE HYDROCHLORIDE 200 MG/1
200 TABLET ORAL EVERY OTHER DAY
Qty: 93 TABLET | Refills: 3
Start: 2017-10-05

## 2017-10-05 NOTE — PROGRESS NOTES
Date: 10/5/2017    Time of Call: 12:41 PM     Diagnosis: PVC, PAF     [ TORB ] Ordering provider: Dr Pérez  Order:   Decrease amiodarone to 200 mg every other day   (or 5 days per week)  Follow up with Cardiac MRI feb-march 2017     Order received by: Daphnie Aguila RN      Follow-up/additional notes:   Will reschd cardiac MRI and coordinate with follow up early next year.   My chart message sent to pt

## 2017-11-29 ENCOUNTER — CARE COORDINATION (OUTPATIENT)
Dept: CARDIOLOGY | Facility: CLINIC | Age: 67
End: 2017-11-29

## 2017-11-29 DIAGNOSIS — I10 HTN (HYPERTENSION): Primary | ICD-10-CM

## 2017-11-29 RX ORDER — AMLODIPINE BESYLATE 5 MG/1
5 TABLET ORAL DAILY
Qty: 30 TABLET
Start: 2017-11-29

## 2018-03-01 ENCOUNTER — CARE COORDINATION (OUTPATIENT)
Dept: CARDIOLOGY | Facility: CLINIC | Age: 68
End: 2018-03-01

## 2018-03-01 DIAGNOSIS — I48.0 PAROXYSMAL ATRIAL FIBRILLATION (H): Primary | ICD-10-CM

## 2018-03-01 DIAGNOSIS — I47.20 VENTRICULAR TACHYARRHYTHMIA (H): ICD-10-CM

## 2018-03-01 DIAGNOSIS — Z79.899 ON AMIODARONE THERAPY: ICD-10-CM

## 2018-03-02 ENCOUNTER — CARE COORDINATION (OUTPATIENT)
Dept: CARDIOLOGY | Facility: CLINIC | Age: 68
End: 2018-03-02

## 2018-03-09 ENCOUNTER — MYC MEDICAL ADVICE (OUTPATIENT)
Dept: CARDIOLOGY | Facility: CLINIC | Age: 68
End: 2018-03-09

## 2018-03-22 ENCOUNTER — PRE VISIT (OUTPATIENT)
Dept: CARDIOLOGY | Facility: CLINIC | Age: 68
End: 2018-03-22

## 2018-03-23 NOTE — TELEPHONE ENCOUNTER
Clinical Cardiac Electrophysiology    Chief Complaint: follow up for PVCs, PAF.    HPI: Mr. Soto is 67 year old male with a PMHx of NICM (LVEF 42%), CKD, HTN, Atrial Fibrillation on AC, PVC's s/p ablation who presents for follow up.     Patient had a PVC ablation in 2016 that was complicated by pericardial tamponade. After drainage, patient was in atrial fibrillation with RVR. He was cardioverted with good results for around a year. He was on Amio, with good maintenance of sinus rhythm.     However, recently, patient has had several episodes where he felt more symptomatic with his atrial fibrillation. These episodes usually occur when he is having a non-specific chest/rib pain. The chest pain is worse upon palpitation and sometimes patient can not lay on that side. He denies positional changes or pain during inspiration. When this pain is severe, he notes that he is in atrial fibrillation. His symptoms include chest tightness, nausea, and shortness of breath.     Patient has seen his PCP for this pain. He was prescribed steroids which completely resolved the pain. However, when he stops the steroids, the pain can recur and his symptoms of atrial fibrillation return. He was most recently prescribed steroids two days ago with again improvement in his symptoms.     Overall, the story is unclear if patient is in persistent atrial fibrillation or going in and out of atrial fibrillation with this chest pain.     He is currently on  amiodarone 200mg daily, Diltiazem 90mg in the AM, and Metoprolol 25mg BID.     Patient denies chest pain, orthopnea, dizziness, LH, syncope.       Current Outpatient Prescriptions   Medication Sig Dispense Refill     amLODIPine (NORVASC) 5 MG tablet Take 1 tablet (5 mg) by mouth daily 30 tablet      amiodarone (PACERONE/CODARONE) 200 MG tablet Take 1 tablet (200 mg) by mouth every other day 93 tablet 3     warfarin (COUMADIN) 5 MG tablet TAKE 1 TO 1 AND 1/2 TABLETS (5 TO 7.5MG) DAILY  130 tablet 3     metoprolol (TOPROL-XL) 25 MG 24 hr tablet Take 1 tablet (25 mg) by mouth daily 30 tablet      PREDNISONE PO Take 10 mg by mouth        lisinopril (PRINIVIL/ZESTRIL) 20 MG tablet Take 1 tablet (20 mg) by mouth daily In am 93 tablet 3     fish oil-omega-3 fatty acids (FISH OIL) 1000 MG capsule Take 1 g by mouth daily.       Multiple Vitamin (MULTI VITAMIN MENS PO) Take  by mouth. 1 tablet daily       co-enzyme Q-10 (COQ10) 100 MG CAPS Take  by mouth daily. 1 caps         Past Medical History:   Diagnosis Date     Atrial fibrillation (H)      Cardiomyopathy (H)      CKD (chronic kidney disease)      Hypertension      Other premature beats      Palpitations        Past Surgical History:   Procedure Laterality Date     ANESTHESIA CARDIOVERSION N/A 3/17/2015    Procedure: ANESTHESIA CARDIOVERSION;  Surgeon: Generic Anesthesia Provider;  Location: [facility]     ANESTHESIA CARDIOVERSION N/A 3/2/2016    Procedure: ANESTHESIA CARDIOVERSION;  Surgeon: GENERIC ANESTHESIA PROVIDER;  Location: [facility] OR     ANESTHESIA CARDIOVERSION N/A 8/8/2017    Procedure: ANESTHESIA CARDIOVERSION;  Anesthesia Offsite Cardioversion @1330;  Surgeon: GENERIC ANESTHESIA PROVIDER;  Location:  OR     EP ABLATION / EP STUDIES  02/26/16    PVC ablation w/P. effusion/tamponade     LAPAROSCOPIC CHOLECYSTECTOMY  2/25/2014    Procedure: LAPAROSCOPIC CHOLECYSTECTOMY;  Laparoscopic Cholecystectomy;  Surgeon: Sulma Fenton MD;  Location:  OR       No family history on file.    Social History   Substance Use Topics     Smoking status: Never Smoker     Smokeless tobacco: Not on file     Alcohol use No       No Known Allergies    Physical Examination:  Vitals: There were no vitals taken for this visit.  BMI= There is no height or weight on file to calculate BMI.    GENERAL APPEARANCE: healthy, alert and no distress  NECK: JVP is not visible  CHEST: lungs clear to auscultation - no rales, rhonchi or wheezes  CARDIOVASCULAR: Irregular  rhythm  ABDOMEN: soft, non tender, without hepatosplenomegaly  EXTREMITIES: no edema    CMri 9/2016  1. Moderately dilated left ventricular size and mildly reduced systolic function with a calculated ejection fraction of 42 %.  2. Normal right ventricular size and mildly reduced systolic function with a calculated ejection fraction of 47%.   3. On delayed enhancement imaging, there is focal epicardial hyperenhancement in the basal inferolateral wall that appears less  prominent as compared to cardiac MRI 1/16.     Assessment and recommendations:  Mr. Soto is 66 year old male with a PMHx of NICM (LVEF 42%), CKD, HTN, Atrial Fibrillation on AC, PVC's s/p ablation who presents for follow up.     #Atrial Fibrillation:  -Patient has been having increasing symptoms from his atrial fibrillation. This is likely due to his chest/rib pain increasing his HR.   -At this point, it is unclear if patient is persistently in atrial fibrillation or if patient intermittently converts to sinus. This is important to distinguish as patient is on amiodarone.   -Will attempt to cardiovert patient one more time while continuing Amio 200mg daily. If patient continues to be in atrial fibrillation after cardioversion, will stop the Amiodarone and increase rate control medications.   -Continue Metoprolol 25mg BID and Diltiazem 90mg.   -Continue Warfarin. Last INR was 2.1. Will check again before cardioversion.      #NICM   -Patient's LVEF was 42% on the last MRI.   -Will repeat MRI prior to next visit. Will also assess for pericardial disease with MRI given his atypical chest pain symptoms mentioned in the HPI.     #PVC's   -EKG showed no PVC's   -Continue Amio     #Bradycardia   -HR is normal. Will assess HR after cardioversion and adjust medications accordingly.         I very much appreciated the opportunity to see and assess Mr Endy Soto in the clinic with CV Fellow       I agree with the note above which summarizes my findings and  current recommendations    Please do not hesitate to contact my office if you have any questions or concerns.      Nelson Pérez MD  Cardiac Arrhythmia Service  [facility]  [phone]

## 2018-03-29 ENCOUNTER — HOSPITAL ENCOUNTER (OUTPATIENT)
Dept: MRI IMAGING | Facility: CLINIC | Age: 68
Discharge: HOME | End: 2018-03-29
Attending: INTERNAL MEDICINE | Admitting: INTERNAL MEDICINE
Payer: MEDICARE

## 2018-03-29 ENCOUNTER — OFFICE VISIT (OUTPATIENT)
Dept: CARDIOLOGY | Facility: CLINIC | Age: 68
End: 2018-03-29
Attending: INTERNAL MEDICINE
Payer: MEDICARE

## 2018-03-29 VITALS — BODY MASS INDEX: 29.79 KG/M2 | HEIGHT: 76 IN | WEIGHT: 244.6 LBS

## 2018-03-29 DIAGNOSIS — I48.19 PERSISTENT ATRIAL FIBRILLATION (H): Primary | ICD-10-CM

## 2018-03-29 DIAGNOSIS — Z79.899 ON AMIODARONE THERAPY: Primary | ICD-10-CM

## 2018-03-29 DIAGNOSIS — I42.8 NICM (NONISCHEMIC CARDIOMYOPATHY) (H): ICD-10-CM

## 2018-03-29 PROCEDURE — 99214 OFFICE O/P EST MOD 30 MIN: CPT | Mod: 25 | Performed by: INTERNAL MEDICINE

## 2018-03-29 PROCEDURE — 75561 CARDIAC MRI FOR MORPH W/DYE: CPT | Mod: 26 | Performed by: INTERNAL MEDICINE

## 2018-03-29 PROCEDURE — 93005 ELECTROCARDIOGRAM TRACING: CPT | Mod: ZF

## 2018-03-29 PROCEDURE — A9585 GADOBUTROL INJECTION: HCPCS | Performed by: INTERNAL MEDICINE

## 2018-03-29 PROCEDURE — 93010 ELECTROCARDIOGRAM REPORT: CPT | Mod: ZP | Performed by: INTERNAL MEDICINE

## 2018-03-29 PROCEDURE — 75561 CARDIAC MRI FOR MORPH W/DYE: CPT

## 2018-03-29 PROCEDURE — 25000128 H RX IP 250 OP 636: Performed by: INTERNAL MEDICINE

## 2018-03-29 PROCEDURE — G0463 HOSPITAL OUTPT CLINIC VISIT: HCPCS | Mod: 25,ZF

## 2018-03-29 RX ORDER — GADOBUTROL 604.72 MG/ML
10 INJECTION INTRAVENOUS ONCE
Status: COMPLETED | OUTPATIENT
Start: 2018-03-29 | End: 2018-03-29

## 2018-03-29 RX ORDER — FLUTICASONE PROPIONATE 50 MCG
2 SPRAY, SUSPENSION (ML) NASAL
COMMUNITY
Start: 2017-11-09

## 2018-03-29 RX ORDER — GADOBUTROL 604.72 MG/ML
7.5 INJECTION INTRAVENOUS ONCE
Status: COMPLETED | OUTPATIENT
Start: 2018-03-29 | End: 2018-03-29

## 2018-03-29 RX ADMIN — GADOBUTROL 10 ML: 604.72 INJECTION INTRAVENOUS at 10:21

## 2018-03-29 RX ADMIN — GADOBUTROL 3 ML: 604.72 INJECTION INTRAVENOUS at 10:21

## 2018-03-29 ASSESSMENT — PAIN SCALES - GENERAL: PAINLEVEL: NO PAIN (0)

## 2018-03-29 NOTE — MR AVS SNAPSHOT
"              After Visit Summary   3/29/2018    Endy Soto    MRN: [id number]           Patient Information     Date Of Birth          1950        Visit Information        Provider Department      3/29/2018 1:30 PM Nelson Pérez MD [facility]        Today's Diagnoses     Persistent atrial fibrillation (H)    -  1       Follow-ups after your visit        Who to contact     If you have questions or need follow up information about today's clinic visit or your schedule please contact [facility] directly at [phone].  Normal or non-critical lab and imaging results will be communicated to you by boarding passhart, letter or phone within 4 business days after the clinic has received the results. If you do not hear from us within 7 days, please contact the clinic through [employer] or phone. If you have a critical or abnormal lab result, we will notify you by phone as soon as possible.  Submit refill requests through [employer] or call your pharmacy and they will forward the refill request to us. Please allow 3 business days for your refill to be completed.          Additional Information About Your Visit        MyChart Information     [employer] gives you secure access to your electronic health record. If you see a primary care provider, you can also send messages to your care team and make appointments. If you have questions, please call your primary care clinic.  If you do not have a primary care provider, please call [phone] and they will assist you.        Care EveryWhere ID     This is your Care EveryWhere ID. This could be used by other organizations to access your [place] medical records  [id number]        Your Vitals Were     Height BMI (Body Mass Index)                1.93 m (6' 4\") 29.77 kg/m2           Blood Pressure from Last 3 Encounters:   08/08/17 138/74   08/03/17 137/88   04/27/17 140/81    Weight from Last 3 Encounters:   03/29/18 110.9 kg (244 lb 9.6 oz)   08/08/17 107.5 kg " (237 lb)   08/03/17 109.9 kg (242 lb 4.8 oz)              We Performed the Following     EKG 12-lead, tracing only (Same Day)        Primary Care Provider Office Phone # Fax #    Marko Aparicio [phone]       [facility] AND [address] [address]        Equal Access to Services     [facility] : Hadii aad ku hadasho Soomaali, waaxda luqadaha, qaybta kaalmada adeegyada, waxay idiin hayaan adeeg sally laAnniejesseunice . So [facility] [phone].    ATENCIÓN: Si habla español, tiene a davey disposición servicios gratuitos de asistencia lingüística. Freda al [phone].    We comply with applicable federal civil rights laws and [place] laws. We do not discriminate on the basis of race, color, national origin, age, disability, sex, sexual orientation, or gender identity.            Thank you!     Thank you for choosing [facility]  for your care. Our goal is always to provide you with excellent care. Hearing back from our patients is one way we can continue to improve our services. Please take a few minutes to complete the written survey that you may receive in the mail after your visit with us. Thank you!             Your Updated Medication List - Protect others around you: Learn how to safely use, store and throw away your medicines at www.disposemymeds.org.          This list is accurate as of 3/29/18  2:09 PM.  Always use your most recent med list.                   Brand Name Dispense Instructions for use Diagnosis    amiodarone 200 MG tablet    PACERONE/CODARONE    93 tablet    Take 1 tablet (200 mg) by mouth every other day    Paroxysmal atrial fibrillation (H)       amLODIPine 5 MG tablet    NORVASC    30 tablet    Take 1 tablet (5 mg) by mouth daily    HTN (hypertension)       co-enzyme Q-10 100 MG Caps capsule      Take  by mouth daily. 1 caps    PVC (premature ventricular contraction), Chest pain       fish oil-omega-3 fatty acids 1000 MG capsule      Take 1 g by mouth  daily.        fluticasone 50 MCG/ACT spray    FLONASE     2 sprays        lisinopril 20 MG tablet    PRINIVIL/ZESTRIL    93 tablet    Take 1 tablet (20 mg) by mouth daily In am    HTN (hypertension)       metoprolol succinate 25 MG 24 hr tablet    TOPROL-XL    30 tablet    Take 1 tablet (25 mg) by mouth daily    Paroxysmal atrial fibrillation (H)       MULTI VITAMIN MENS PO      Take  by mouth. 1 tablet daily    PVC (premature ventricular contraction), Chest pain       PREDNISONE PO      Take 10 mg by mouth        warfarin 5 MG tablet    COUMADIN    130 tablet    TAKE 1 TO 1 AND 1/2 TABLETS (5 TO 7.5MG) DAILY    Paroxysmal atrial fibrillation (H)

## 2018-03-29 NOTE — NURSING NOTE
Chief Complaint   Patient presents with     Follow Up For     EKG- afib, Cardiac MRI and PFT, 6 month follow up (8/3/17)     Vitals were taken and medications were reconciled. EKG was performed    Hilary LEONG  1:17 PM

## 2018-03-29 NOTE — LETTER
3/29/2018      RE: Endy Soto  [address] [facility] [address]       Dear Colleague,    Thank you for the opportunity to participate in the care of your patient, Endy Soto, at the [facility]. Please see a copy of my visit note below.          Clinical Cardiac Electrophysiology    Chief Complaint: follow up for PVCs, PAF.    HPI: Mr. Soto is 67 year old male with a PMHx of NICM (LVEF 42%), CKD, HTN, Atrial Fibrillation on AC, PVC's s/p ablation who presents for follow up.     Patient had a PVC ablation in 2016 that was complicated by pericardial tamponade. After drainage, patient was in atrial fibrillation with RVR. He was cardioverted with good results for around a year. He was on Amio, with good maintenance of sinus rhythm.     Had MR today but results not yet available    Feels well. Sometimes sluggish in AM, but does feel better after on Treadmill for a while. Possibly has inadequate HR response. Frequency of PVCs varies but seem less often. Only one seen on today's ECG and rhythm  Strip    No HF symptoms. No CP at rest or with exertion.    No amio side-effects      Current Outpatient Prescriptions   Medication Sig Dispense Refill     fluticasone (FLONASE) 50 MCG/ACT spray 2 sprays       amLODIPine (NORVASC) 5 MG tablet Take 1 tablet (5 mg) by mouth daily 30 tablet      amiodarone (PACERONE/CODARONE) 200 MG tablet Take 1 tablet (200 mg) by mouth every other day 93 tablet 3     warfarin (COUMADIN) 5 MG tablet TAKE 1 TO 1 AND 1/2 TABLETS (5 TO 7.5MG) DAILY 130 tablet 3     metoprolol (TOPROL-XL) 25 MG 24 hr tablet Take 1 tablet (25 mg) by mouth daily 30 tablet      lisinopril (PRINIVIL/ZESTRIL) 20 MG tablet Take 1 tablet (20 mg) by mouth daily In am 93 tablet 3     fish oil-omega-3 fatty acids (FISH OIL) 1000 MG capsule Take 1 g by mouth daily.       Multiple Vitamin (MULTI VITAMIN MENS PO) Take  by mouth. 1 tablet daily       co-enzyme  "Q-10 (COQ10) 100 MG CAPS Take  by mouth daily. 1 caps       PREDNISONE PO Take 10 mg by mouth          Past Medical History:   Diagnosis Date     Atrial fibrillation (H)      Cardiomyopathy (H)      CKD (chronic kidney disease)      Hypertension      Other premature beats      Palpitations        Past Surgical History:   Procedure Laterality Date     ANESTHESIA CARDIOVERSION N/A 3/17/2015    Procedure: ANESTHESIA CARDIOVERSION;  Surgeon: Generic Anesthesia Provider;  Location: [facility] OR     ANESTHESIA CARDIOVERSION N/A 3/2/2016    Procedure: ANESTHESIA CARDIOVERSION;  Surgeon: GENERIC ANESTHESIA PROVIDER;  Location: [facility] OR     ANESTHESIA CARDIOVERSION N/A 8/8/2017    Procedure: ANESTHESIA CARDIOVERSION;  Anesthesia Offsite Cardioversion @1330;  Surgeon: GENERIC ANESTHESIA PROVIDER;  Location: [facility] OR     EP ABLATION / EP STUDIES  02/26/16    PVC ablation w/P. effusion/tamponade     LAPAROSCOPIC CHOLECYSTECTOMY  2/25/2014    Procedure: LAPAROSCOPIC CHOLECYSTECTOMY;  Laparoscopic Cholecystectomy;  Surgeon: Sulma Fenton MD;  Location: [facility] OR       No family history on file.    Social History   Substance Use Topics     Smoking status: Never Smoker     Smokeless tobacco: Never Used     Alcohol use No       No Known Allergies    Physical Examination:  Vitals: Ht 1.93 m (6' 4\")  Wt 110.9 kg (244 lb 9.6 oz)  BMI 29.77 kg/m2  BMI= Body mass index is 29.77 kg/(m^2).    GENERAL APPEARANCE: healthy, alert and no distress  NECK: JVP is not visible  CHEST: lungs clear to auscultation - no rales, rhonchi or wheezes  CARDIOVASCULAR: Irregular rhythm  ABDOMEN: soft, non tender, without hepatosplenomegaly  EXTREMITIES: no edema    CMri 9/2016  1. Moderately dilated left ventricular size and mildly reduced systolic function with a calculated ejection fraction of 42 %.  2. Normal right ventricular size and mildly reduced systolic function with a calculated ejection fraction of 47%.   3. On delayed enhancement imaging, there " is focal epicardial hyperenhancement in the basal inferolateral wall that appears less  prominent as compared to cardiac MRI 1/16.       Assessment and recommendations:  Mr. Soto is 66 year old male with a PMHx of NICM (LVEF 42%), CKD, HTN, Atrial Fibrillation on AC, PVC's s/p ablation who presents for follow up.     #Atrial Fibrillation:  - Stable and infrequent symptoms    #NICM   -Patient's LVEF was 42% on the last MRI. Latest (today) pending, but no symptoms to suggest a problem     #PVC's   -EKG showed no PVC's   -Continue Amio 200 eOD     RTC 1 yr, but will check MR and adjust clinic if needed    I very much appreciated the opportunity to see and assess Mr Endy Soto in the clinic today    I agree with the note above which summarizes my findings and current recommendations    Please do not hesitate to contact my office if you have any questions or concerns.      Nelson Pérez MD  Cardiac Arrhythmia Service  [facility]  [phone]

## 2018-03-29 NOTE — PROGRESS NOTES
Clinical Cardiac Electrophysiology    Chief Complaint: follow up for PVCs, PAF.    HPI: Mr. Soto is 67 year old male with a PMHx of NICM (LVEF 42%), CKD, HTN, Atrial Fibrillation on AC, PVC's s/p ablation who presents for follow up.     Patient had a PVC ablation in 2016 that was complicated by pericardial tamponade. After drainage, patient was in atrial fibrillation with RVR. He was cardioverted with good results for around a year. He was on Amio, with good maintenance of sinus rhythm.     Had MR today but results not yet available    Feels well. Sometimes sluggish in AM, but does feel better after on Treadmill for a while. Possibly has inadequate HR response. Frequency of PVCs varies but seem less often. Only one seen on today's ECG and rhythm  Strip    No HF symptoms. No CP at rest or with exertion.    No amio side-effects      Current Outpatient Prescriptions   Medication Sig Dispense Refill     fluticasone (FLONASE) 50 MCG/ACT spray 2 sprays       amLODIPine (NORVASC) 5 MG tablet Take 1 tablet (5 mg) by mouth daily 30 tablet      amiodarone (PACERONE/CODARONE) 200 MG tablet Take 1 tablet (200 mg) by mouth every other day 93 tablet 3     warfarin (COUMADIN) 5 MG tablet TAKE 1 TO 1 AND 1/2 TABLETS (5 TO 7.5MG) DAILY 130 tablet 3     metoprolol (TOPROL-XL) 25 MG 24 hr tablet Take 1 tablet (25 mg) by mouth daily 30 tablet      lisinopril (PRINIVIL/ZESTRIL) 20 MG tablet Take 1 tablet (20 mg) by mouth daily In am 93 tablet 3     fish oil-omega-3 fatty acids (FISH OIL) 1000 MG capsule Take 1 g by mouth daily.       Multiple Vitamin (MULTI VITAMIN MENS PO) Take  by mouth. 1 tablet daily       co-enzyme Q-10 (COQ10) 100 MG CAPS Take  by mouth daily. 1 caps       PREDNISONE PO Take 10 mg by mouth          Past Medical History:   Diagnosis Date     Atrial fibrillation (H)      Cardiomyopathy (H)      CKD (chronic kidney disease)      Hypertension      Other premature beats      Palpitations        Past Surgical  "History:   Procedure Laterality Date     ANESTHESIA CARDIOVERSION N/A 3/17/2015    Procedure: ANESTHESIA CARDIOVERSION;  Surgeon: Generic Anesthesia Provider;  Location: [facility] OR     ANESTHESIA CARDIOVERSION N/A 3/2/2016    Procedure: ANESTHESIA CARDIOVERSION;  Surgeon: GENERIC ANESTHESIA PROVIDER;  Location: [facility] OR     ANESTHESIA CARDIOVERSION N/A 8/8/2017    Procedure: ANESTHESIA CARDIOVERSION;  Anesthesia Offsite Cardioversion @1330;  Surgeon: GENERIC ANESTHESIA PROVIDER;  Location: [facility] OR     EP ABLATION / EP STUDIES  02/26/16    PVC ablation w/P. effusion/tamponade     LAPAROSCOPIC CHOLECYSTECTOMY  2/25/2014    Procedure: LAPAROSCOPIC CHOLECYSTECTOMY;  Laparoscopic Cholecystectomy;  Surgeon: Sulma Fenton MD;  Location: [facility] OR       No family history on file.    Social History   Substance Use Topics     Smoking status: Never Smoker     Smokeless tobacco: Never Used     Alcohol use No       No Known Allergies    Physical Examination:  Vitals: Ht 1.93 m (6' 4\")  Wt 110.9 kg (244 lb 9.6 oz)  BMI 29.77 kg/m2  BMI= Body mass index is 29.77 kg/(m^2).    GENERAL APPEARANCE: healthy, alert and no distress  NECK: JVP is not visible  CHEST: lungs clear to auscultation - no rales, rhonchi or wheezes  CARDIOVASCULAR: Irregular rhythm  ABDOMEN: soft, non tender, without hepatosplenomegaly  EXTREMITIES: no edema    CMri 9/2016  1. Moderately dilated left ventricular size and mildly reduced systolic function with a calculated ejection fraction of 42 %.  2. Normal right ventricular size and mildly reduced systolic function with a calculated ejection fraction of 47%.   3. On delayed enhancement imaging, there is focal epicardial hyperenhancement in the basal inferolateral wall that appears less  prominent as compared to cardiac MRI 1/16.       Assessment and recommendations:  Mr. Soto is 66 year old male with a PMHx of NICM (LVEF 42%), CKD, HTN, Atrial Fibrillation on AC, PVC's s/p ablation who presents for follow " up.     #Atrial Fibrillation:  - Stable and infrequent symptoms    #NICM   -Patient's LVEF was 42% on the last MRI. Latest (today) pending, but no symptoms to suggest a problem     #PVC's   -EKG showed no PVC's   -Continue Amio 200 eOD     RTC 1 yr, but will check MR and adjust clinic if needed    I very much appreciated the opportunity to see and assess Mr Endy Soto in the clinic today    I agree with the note above which summarizes my findings and current recommendations    Please do not hesitate to contact my office if you have any questions or concerns.      Nelson Pérez MD  Cardiac Arrhythmia Service  [facility]  [phone]

## 2018-03-30 LAB — INTERPRETATION ECG - MUSE: NORMAL

## 2018-04-17 LAB
DLCOUNC-%PRED-PRE: 117 %
DLCOUNC-PRE: 33.63 ML/MIN/MMHG
DLCOUNC-PRED: 28.55 ML/MIN/MMHG
ERV-%PRED-PRE: 28 %
ERV-PRE: 0.37 L
ERV-PRED: 1.28 L
EXPTIME-PRE: 10.99 SEC
FEF2575-%PRED-PRE: 52 %
FEF2575-PRE: 1.49 L/SEC
FEF2575-PRED: 2.83 L/SEC
FEFMAX-%PRED-PRE: 101 %
FEFMAX-PRE: 9.83 L/SEC
FEFMAX-PRED: 9.66 L/SEC
FEV1-%PRED-PRE: 84 %
FEV1-PRE: 3.2 L
FEV1FEV6-PRE: 72 %
FEV1FEV6-PRED: 78 %
FEV1FVC-PRE: 68 %
FEV1FVC-PRED: 74 %
FEV1SVC-PRE: 68 %
FEV1SVC-PRED: 67 %
FIFMAX-PRE: 8.25 L/SEC
FVC-%PRED-PRE: 92 %
FVC-PRE: 4.7 L
FVC-PRED: 5.08 L
IC-%PRED-PRE: 98 %
IC-PRE: 4.31 L
IC-PRED: 4.39 L
VA-%PRED-PRE: 90 %
VA-PRE: 7.09 L
VC-%PRED-PRE: 82 %
VC-PRE: 4.68 L
VC-PRED: 5.67 L

## 2018-06-22 DIAGNOSIS — I48.0 PAROXYSMAL ATRIAL FIBRILLATION (H): ICD-10-CM

## 2018-06-25 RX ORDER — WARFARIN SODIUM 5 MG/1
TABLET ORAL
Qty: 130 TABLET | Refills: 3 | Status: SHIPPED | OUTPATIENT
Start: 2018-06-25 | End: 2019-08-03

## 2019-02-07 ENCOUNTER — CARE COORDINATION (OUTPATIENT)
Dept: CARDIOLOGY | Facility: CLINIC | Age: 69
End: 2019-02-07

## 2019-02-07 DIAGNOSIS — I47.20 VENTRICULAR TACHYARRHYTHMIA (H): Primary | ICD-10-CM

## 2019-02-07 DIAGNOSIS — I48.0 PAROXYSMAL ATRIAL FIBRILLATION (H): ICD-10-CM

## 2019-02-07 DIAGNOSIS — I49.3 PVC'S (PREMATURE VENTRICULAR CONTRACTIONS): ICD-10-CM

## 2019-02-07 DIAGNOSIS — Z79.899 ON AMIODARONE THERAPY: ICD-10-CM

## 2019-02-21 ENCOUNTER — DOCUMENTATION ONLY (OUTPATIENT)
Dept: CARE COORDINATION | Facility: CLINIC | Age: 69
End: 2019-02-21

## 2019-03-19 LAB
ALBUMIN SERPL-MCNC: 4.4 G/DL
ALP SERPL-CCNC: 66 U/L
ALT SERPL-CCNC: 25 U/L
ANION GAP SERPL CALCULATED.3IONS-SCNC: 8 MMOL/L
AST SERPL-CCNC: 27 U/L
BILIRUB SERPL-MCNC: 0.9 MG/DL
BUN SERPL-MCNC: 14 MG/DL
CALCIUM SERPL-MCNC: 10.6 MG/DL
CHLORIDE SERPLBLD-SCNC: 103 MMOL/L
CO2 SERPL-SCNC: 26 MMOL/L
CREAT SERPL-MCNC: 1.41 MG/DL
ERYTHROCYTE [DISTWIDTH] IN BLOOD BY AUTOMATED COUNT: 13.1 %
GFR SERPL CREATININE-BSD FRML MDRD: 60 ML/MIN/1.73M2
GLUCOSE SERPL-MCNC: 103 MG/DL (ref 70–99)
HCT VFR BLD AUTO: 48.6 %
HEMOGLOBIN: 16.5 G/DL (ref 13.3–17.7)
INR PPP: 1.7
MCH RBC QN AUTO: 30.1 PG
MCHC RBC AUTO-ENTMCNC: 34 G/DL
MCV RBC AUTO: 89 FL
PLATELET COUNT - QUEST: 199 10^9/L (ref 150–450)
POTASSIUM SERPL-SCNC: 4.1 MMOL/L
PROT SERPL-MCNC: 8 G/DL
RBC # BLD AUTO: 5.49 10^12/L
SODIUM SERPL-SCNC: 137 MMOL/L
TSH SERPL-ACNC: 2.54 MCU/ML
WBC # BLD AUTO: 6.1 10^9/L

## 2019-04-04 ENCOUNTER — OFFICE VISIT (OUTPATIENT)
Dept: CARDIOLOGY | Facility: CLINIC | Age: 69
End: 2019-04-04
Attending: INTERNAL MEDICINE
Payer: MEDICARE

## 2019-04-04 ENCOUNTER — HOSPITAL ENCOUNTER (OUTPATIENT)
Dept: MRI IMAGING | Facility: CLINIC | Age: 69
Discharge: HOME | End: 2019-04-04
Attending: INTERNAL MEDICINE | Admitting: INTERNAL MEDICINE
Payer: MEDICARE

## 2019-04-04 VITALS
OXYGEN SATURATION: 95 % | DIASTOLIC BLOOD PRESSURE: 90 MMHG | RESPIRATION RATE: 14 BRPM | SYSTOLIC BLOOD PRESSURE: 150 MMHG

## 2019-04-04 VITALS
SYSTOLIC BLOOD PRESSURE: 142 MMHG | WEIGHT: 245.2 LBS | OXYGEN SATURATION: 96 % | HEART RATE: 71 BPM | DIASTOLIC BLOOD PRESSURE: 91 MMHG | BODY MASS INDEX: 30.49 KG/M2 | HEIGHT: 75 IN

## 2019-04-04 DIAGNOSIS — I49.3 PVC'S (PREMATURE VENTRICULAR CONTRACTIONS): ICD-10-CM

## 2019-04-04 DIAGNOSIS — I47.20 VENTRICULAR TACHYARRHYTHMIA (H): ICD-10-CM

## 2019-04-04 DIAGNOSIS — Z79.899 ON AMIODARONE THERAPY: ICD-10-CM

## 2019-04-04 DIAGNOSIS — I47.20 VENTRICULAR TACHYCARDIA (H): Primary | ICD-10-CM

## 2019-04-04 DIAGNOSIS — I48.0 PAROXYSMAL ATRIAL FIBRILLATION (H): ICD-10-CM

## 2019-04-04 LAB
DLCOUNC-%PRED-PRE: 105 %
DLCOUNC-PRE: 31.87 ML/MIN/MMHG
DLCOUNC-PRED: 30.27 ML/MIN/MMHG
ERV-%PRED-PRE: 59 %
ERV-PRE: 0.74 L
ERV-PRED: 1.26 L
EXPTIME-PRE: 8.41 SEC
FEF2575-%PRED-PRE: 68 %
FEF2575-PRE: 1.94 L/SEC
FEF2575-PRED: 2.81 L/SEC
FEFMAX-%PRED-PRE: 106 %
FEFMAX-PRE: 10.23 L/SEC
FEFMAX-PRED: 9.62 L/SEC
FEV1-%PRED-PRE: 86 %
FEV1-PRE: 3.27 L
FEV1FEV6-PRE: 73 %
FEV1FEV6-PRED: 78 %
FEV1FVC-PRE: 70 %
FEV1FVC-PRED: 75 %
FEV1SVC-PRE: 70 %
FEV1SVC-PRED: 67 %
FIFMAX-PRE: 9.45 L/SEC
FVC-%PRED-PRE: 92 %
FVC-PRE: 4.68 L
FVC-PRED: 5.07 L
IC-%PRED-PRE: 89 %
IC-PRE: 3.96 L
IC-PRED: 4.4 L
VA-%PRED-PRE: 92 %
VA-PRE: 7.07 L
VC-%PRED-PRE: 83 %
VC-PRE: 4.71 L
VC-PRED: 5.66 L

## 2019-04-04 PROCEDURE — 40000065 ZZH STATISTIC EKG NON-CHARGEABLE

## 2019-04-04 PROCEDURE — 99214 OFFICE O/P EST MOD 30 MIN: CPT | Mod: 25 | Performed by: INTERNAL MEDICINE

## 2019-04-04 PROCEDURE — 25000128 H RX IP 250 OP 636: Performed by: INTERNAL MEDICINE

## 2019-04-04 PROCEDURE — 93018 CV STRESS TEST I&R ONLY: CPT | Performed by: INTERNAL MEDICINE

## 2019-04-04 PROCEDURE — 93017 CV STRESS TEST TRACING ONLY: CPT

## 2019-04-04 PROCEDURE — 93016 CV STRESS TEST SUPVJ ONLY: CPT | Performed by: INTERNAL MEDICINE

## 2019-04-04 PROCEDURE — 75563 CARD MRI W/STRESS IMG & DYE: CPT | Mod: 26 | Performed by: INTERNAL MEDICINE

## 2019-04-04 PROCEDURE — 75565 CARD MRI VELOC FLOW MAPPING: CPT | Mod: 26 | Performed by: INTERNAL MEDICINE

## 2019-04-04 PROCEDURE — 75565 CARD MRI VELOC FLOW MAPPING: CPT

## 2019-04-04 PROCEDURE — A9585 GADOBUTROL INJECTION: HCPCS | Performed by: INTERNAL MEDICINE

## 2019-04-04 PROCEDURE — G0463 HOSPITAL OUTPT CLINIC VISIT: HCPCS | Mod: 25,ZF

## 2019-04-04 PROCEDURE — 93005 ELECTROCARDIOGRAM TRACING: CPT

## 2019-04-04 PROCEDURE — 25500064 ZZH RX 255 OP 636: Performed by: INTERNAL MEDICINE

## 2019-04-04 PROCEDURE — 93010 ELECTROCARDIOGRAM REPORT: CPT | Performed by: INTERNAL MEDICINE

## 2019-04-04 RX ORDER — AMINOPHYLLINE 25 MG/ML
100 INJECTION, SOLUTION INTRAVENOUS ONCE
Status: DISCONTINUED | OUTPATIENT
Start: 2019-04-04 | End: 2019-04-05 | Stop reason: HOSPADM

## 2019-04-04 RX ORDER — DIAZEPAM 5 MG
5 TABLET ORAL EVERY 30 MIN PRN
Status: DISCONTINUED | OUTPATIENT
Start: 2019-04-04 | End: 2019-04-05 | Stop reason: HOSPADM

## 2019-04-04 RX ORDER — GADOBUTROL 604.72 MG/ML
10 INJECTION INTRAVENOUS ONCE
Status: COMPLETED | OUTPATIENT
Start: 2019-04-04 | End: 2019-04-04

## 2019-04-04 RX ORDER — ACYCLOVIR 200 MG/1
0-1 CAPSULE ORAL
Status: DISCONTINUED | OUTPATIENT
Start: 2019-04-04 | End: 2019-04-05 | Stop reason: HOSPADM

## 2019-04-04 RX ORDER — ALBUTEROL SULFATE 90 UG/1
2 AEROSOL, METERED RESPIRATORY (INHALATION) EVERY 5 MIN PRN
Status: DISCONTINUED | OUTPATIENT
Start: 2019-04-04 | End: 2019-04-05 | Stop reason: HOSPADM

## 2019-04-04 RX ORDER — REGADENOSON 0.08 MG/ML
0.4 INJECTION, SOLUTION INTRAVENOUS ONCE
Status: COMPLETED | OUTPATIENT
Start: 2019-04-04 | End: 2019-04-04

## 2019-04-04 RX ADMIN — GADOBUTROL 10 ML: 604.72 INJECTION INTRAVENOUS at 11:54

## 2019-04-04 RX ADMIN — REGADENOSON 0.4 MG: 0.08 INJECTION, SOLUTION INTRAVENOUS at 11:17

## 2019-04-04 RX ADMIN — GADOBUTROL 10 ML: 604.72 INJECTION INTRAVENOUS at 11:53

## 2019-04-04 RX ADMIN — GADOBUTROL 8 ML: 604.72 INJECTION INTRAVENOUS at 11:54

## 2019-04-04 ASSESSMENT — ENCOUNTER SYMPTOMS
LEG PAIN: 0
EXERCISE INTOLERANCE: 0
ORTHOPNEA: 0
LIGHT-HEADEDNESS: 0
SYNCOPE: 0
HYPERTENSION: 1
PALPITATIONS: 1
SLEEP DISTURBANCES DUE TO BREATHING: 0
HYPOTENSION: 0

## 2019-04-04 ASSESSMENT — PAIN SCALES - GENERAL: PAINLEVEL: NO PAIN (0)

## 2019-04-04 ASSESSMENT — MIFFLIN-ST. JEOR: SCORE: 1962.85

## 2019-04-04 NOTE — NURSING NOTE
Chief Complaint   Patient presents with     Follow Up      68 year old male with history of A fib, PVC's, NICM, and hypertension presenting for follow up     Vitals were taken and medications were reconciled.     Leydi Murrell CMA    12:55 PM

## 2019-04-04 NOTE — LETTER
4/4/2019      RE: Endy Soto  [address] [facility] [address]       Dear Colleague,    Thank you for the opportunity to participate in the care of your patient, Endy Soto, at the [facility] HEART [facility] at [facility]. Please see a copy of my visit note below.    Clinical Cardiac Electrophysiology    Chief Complaint: follow up for PVCs, PAF.    HPI: Mr. Soto is 67 year old male with a PMHx of NICM (LVEF 42%), CKD, HTN, Atrial Fibrillation on AC, PVC's s/p ablation who presents for follow up.     Patient had a PVC ablation in 2016 that was complicated by pericardial tamponade. After drainage, patient was in atrial fibrillation with RVR. He was cardioverted with good results for around 2 years ago. He was on Amio, with good maintenance of sinus rhythm. Repeat MRI last year shows improvement of function to 47%.    Had MR today but results not yet available    Today Endy states he feels well. He denies any symptoms of CP, SOB, LH/dizziness, palpitations. He denies any concerns regarding his health. He is now retired, former owner of [employer].       Current Outpatient Medications   Medication Sig Dispense Refill     amiodarone (PACERONE/CODARONE) 200 MG tablet Take 1 tablet (200 mg) by mouth every other day 93 tablet 3     amLODIPine (NORVASC) 5 MG tablet Take 1 tablet (5 mg) by mouth daily 30 tablet      co-enzyme Q-10 (COQ10) 100 MG CAPS Take  by mouth daily. 1 caps       fish oil-omega-3 fatty acids (FISH OIL) 1000 MG capsule Take 1 g by mouth daily.       fluticasone (FLONASE) 50 MCG/ACT spray 2 sprays       lisinopril (PRINIVIL/ZESTRIL) 20 MG tablet Take 1 tablet (20 mg) by mouth daily In am 93 tablet 3     metoprolol (TOPROL-XL) 25 MG 24 hr tablet Take 1 tablet (25 mg) by mouth daily 30 tablet      Multiple Vitamin (MULTI VITAMIN MENS PO) Take  by mouth. 1 tablet daily       warfarin (COUMADIN) 5 MG tablet TAKE 1 TO 1 AND 1/2 TABLETS (5 TO 7.5MG) DAILY 130  "tablet 3       Past Medical History:   Diagnosis Date     Atrial fibrillation (H)      Cardiomyopathy (H)      CKD (chronic kidney disease)      Hypertension      Other premature beats      Palpitations        Past Surgical History:   Procedure Laterality Date     ANESTHESIA CARDIOVERSION N/A 3/17/2015    Procedure: ANESTHESIA CARDIOVERSION;  Surgeon: Generic Anesthesia Provider;  Location: [facility] OR     ANESTHESIA CARDIOVERSION N/A 3/2/2016    Procedure: ANESTHESIA CARDIOVERSION;  Surgeon: GENERIC ANESTHESIA PROVIDER;  Location: [facility] OR     ANESTHESIA CARDIOVERSION N/A 8/8/2017    Procedure: ANESTHESIA CARDIOVERSION;  Anesthesia Offsite Cardioversion @1330;  Surgeon: GENERIC ANESTHESIA PROVIDER;  Location: [facility] OR     EP ABLATION / EP STUDIES  02/26/16    PVC ablation w/P. effusion/tamponade     LAPAROSCOPIC CHOLECYSTECTOMY  2/25/2014    Procedure: LAPAROSCOPIC CHOLECYSTECTOMY;  Laparoscopic Cholecystectomy;  Surgeon: Sulma Fenton MD;  Location:  OR       No family history on file.    Social History     Tobacco Use     Smoking status: Never Smoker     Smokeless tobacco: Never Used   Substance Use Topics     Alcohol use: No       No Known Allergies    Physical Examination:  Vitals: BP (!) 142/91 (BP Location: Right arm, Patient Position: Chair, Cuff Size: Adult Large)   Pulse 71   Ht 1.905 m (6' 3\")   Wt 111.2 kg (245 lb 3.2 oz)   SpO2 96%   BMI 30.65 kg/m     BMI= Body mass index is 30.65 kg/m .    GENERAL APPEARANCE: healthy, alert and no distress  NECK: JVP is not visible  CHEST: lungs clear to auscultation - no rales, rhonchi or wheezes  CARDIOVASCULAR: Irregular rhythm  ABDOMEN: soft, non tender, without hepatosplenomegaly  EXTREMITIES: no edema    EKG: Reviewed. Sinus rhythm. No PVCs or atrial arrhythmias.     cMRI 3/29/18  1. The left ventricle is mildly dilated and with normal wall thickness. The global systolic function is  mildly reduced. The LVEF is 47%.      2. The right ventricle is " normal in cavity size. The global systolic function is mildly reduced. The RVEF  is 47%.      3. The left atrium is moderately dilated, the right atrium is mildly dilated.      4. Aortic and mitral regurgitation is present.      5. There is mid myocardial late gadolinium enhancement in the basal lateral segment consistent with non  ischemic myocardial fibrosis.      6. There is no pericardial effusion or thickening.     7. There is no intracardiac thrombus.     CONCLUSIONS:   1. Non-ischemic cardiomyopathy with mild left ventricular dilatation and mild systolic dysfunction, LVEF  47%.   2. Normal right ventricular size with mildly reduced systolic function, RVEF 47%.     When compared to prior CMR from 9/29/2016, the LV function is mildly improved.     CMri 9/2016  1. Moderately dilated left ventricular size and mildly reduced systolic function with a calculated ejection fraction of 42 %.  2. Normal right ventricular size and mildly reduced systolic function with a calculated ejection fraction of 47%.   3. On delayed enhancement imaging, there is focal epicardial hyperenhancement in the basal inferolateral wall that appears less  prominent as compared to cardiac MRI 1/16.     Assessment and recommendations:  Mr. Soto is 66 year old male with a PMHx of NICM (LVEF 42%), CKD, HTN, Atrial Fibrillation on AC, PVC's s/p ablation who presents for follow up.     #Atrial Fibrillation:  - Stable and infrequent symptoms    #NICM   -Patient's LVEF was 47% on the last MRI. Latest (today) pending, but no symptoms to suggest a problem     #PVC's   -EKG showed no PVC's   -Continue Amio 200 eOD     RTC 1 yr, but will check MR and adjust clinic if needed    Álvaro Killian MD, MS  Cardiovascular Fellow, PGY-4      I very much appreciated the opportunity to see and assess Mr Endy Soto in the clinic today    I agree with the note above which summarizes my findings and current recommendations    Please do not hesitate to contact  my office if you have any questions or concerns.      Nelson Pérez MD  Cardiac Arrhythmia Service  [facility]  [phone]

## 2019-04-04 NOTE — PATIENT INSTRUCTIONS
You will be scheduled for a follow up visit: Follow up with Dr Pérez in one year    Medication changes: None    New Medications: None    Testing Scheduled: We will call you with the results of your cardiac MRI    We encourage you to use My Chart as your primary form of communication if possible. If you need assistance in setting this up, please contact our office or ask at your follow up visit.     If you need a medication refill please contact your pharmacy. Please allow at least 3 business days for your refill to be completed.       Cardiology  Telephone Number    Daphnie Aguila [phone]   Or send a message to your provider via my chart.   For scheduling procedures:    [facility]    Clinic appointments       [phone]   For the Device Clinic (Pacemakers and ICD's)   RN's :   Sandi Morgan  During business hours: [phone]    After business hours:   [phone]- select option 4 and ask for job code [id number].          As always, Thank you for trusting us with your health care needs!

## 2019-04-04 NOTE — NURSING NOTE
Med Reconcile: Reviewed and verified all current medications with the patient. The updated medication list was printed and given to the patient.    Return Appointment: Follow up with Dr Pérez in one year. Patient given instructions regarding scheduling next clinic visit. Patient demonstrated understanding of this information and agreed to call with further questions or concerns.    Patient stated he understood all health information given and agreed to call with further questions or concerns.    Rya Aparicio RN  RN Care Coordinator  [facility]  [phone]

## 2019-04-04 NOTE — PROGRESS NOTES
Pt here for cardiac MRI with stress. Safety checklist, allergies, and meds all reviewed. Test explained and all questions answered. Lungs clear. Denied caffeine intake. Lexiscan 0.4mg given over 10 seconds followed by 5cc NS flush. Pt tolerated scan, meds, and contrast well; stable throughout. Pre and post EKG completed. Pt monitored post MRI and escorted back to gold waiting room.

## 2019-04-04 NOTE — PROGRESS NOTES
Clinical Cardiac Electrophysiology    Chief Complaint: follow up for PVCs, PAF.    HPI: Mr. Soto is 67 year old male with a PMHx of NICM (LVEF 42%), CKD, HTN, Atrial Fibrillation on AC, PVC's s/p ablation who presents for follow up.     Patient had a PVC ablation in 2016 that was complicated by pericardial tamponade. After drainage, patient was in atrial fibrillation with RVR. He was cardioverted with good results for around 2 years ago. He was on Amio, with good maintenance of sinus rhythm. Repeat MRI last year shows improvement of function to 47%.    Had MR today but results not yet available    Today Endy states he feels well. He denies any symptoms of CP, SOB, LH/dizziness, palpitations. He denies any concerns regarding his health. He is now retired, former owner of [employer].       Current Outpatient Medications   Medication Sig Dispense Refill     amiodarone (PACERONE/CODARONE) 200 MG tablet Take 1 tablet (200 mg) by mouth every other day 93 tablet 3     amLODIPine (NORVASC) 5 MG tablet Take 1 tablet (5 mg) by mouth daily 30 tablet      co-enzyme Q-10 (COQ10) 100 MG CAPS Take  by mouth daily. 1 caps       fish oil-omega-3 fatty acids (FISH OIL) 1000 MG capsule Take 1 g by mouth daily.       fluticasone (FLONASE) 50 MCG/ACT spray 2 sprays       lisinopril (PRINIVIL/ZESTRIL) 20 MG tablet Take 1 tablet (20 mg) by mouth daily In am 93 tablet 3     metoprolol (TOPROL-XL) 25 MG 24 hr tablet Take 1 tablet (25 mg) by mouth daily 30 tablet      Multiple Vitamin (MULTI VITAMIN MENS PO) Take  by mouth. 1 tablet daily       warfarin (COUMADIN) 5 MG tablet TAKE 1 TO 1 AND 1/2 TABLETS (5 TO 7.5MG) DAILY 130 tablet 3       Past Medical History:   Diagnosis Date     Atrial fibrillation (H)      Cardiomyopathy (H)      CKD (chronic kidney disease)      Hypertension      Other premature beats      Palpitations        Past Surgical History:   Procedure Laterality Date     ANESTHESIA CARDIOVERSION N/A 3/17/2015  "   Procedure: ANESTHESIA CARDIOVERSION;  Surgeon: Generic Anesthesia Provider;  Location: [facility] OR     ANESTHESIA CARDIOVERSION N/A 3/2/2016    Procedure: ANESTHESIA CARDIOVERSION;  Surgeon: GENERIC ANESTHESIA PROVIDER;  Location: [facility] OR     ANESTHESIA CARDIOVERSION N/A 8/8/2017    Procedure: ANESTHESIA CARDIOVERSION;  Anesthesia Offsite Cardioversion @1330;  Surgeon: GENERIC ANESTHESIA PROVIDER;  Location: [facility] OR     EP ABLATION / EP STUDIES  02/26/16    PVC ablation w/P. effusion/tamponade     LAPAROSCOPIC CHOLECYSTECTOMY  2/25/2014    Procedure: LAPAROSCOPIC CHOLECYSTECTOMY;  Laparoscopic Cholecystectomy;  Surgeon: Sulma Fenton MD;  Location: [facility] OR       No family history on file.    Social History     Tobacco Use     Smoking status: Never Smoker     Smokeless tobacco: Never Used   Substance Use Topics     Alcohol use: No       No Known Allergies    Physical Examination:  Vitals: BP (!) 142/91 (BP Location: Right arm, Patient Position: Chair, Cuff Size: Adult Large)   Pulse 71   Ht 1.905 m (6' 3\")   Wt 111.2 kg (245 lb 3.2 oz)   SpO2 96%   BMI 30.65 kg/m    BMI= Body mass index is 30.65 kg/m .    GENERAL APPEARANCE: healthy, alert and no distress  NECK: JVP is not visible  CHEST: lungs clear to auscultation - no rales, rhonchi or wheezes  CARDIOVASCULAR: Irregular rhythm  ABDOMEN: soft, non tender, without hepatosplenomegaly  EXTREMITIES: no edema    EKG: Reviewed. Sinus rhythm. No PVCs or atrial arrhythmias.     cMRI 3/29/18  1. The left ventricle is mildly dilated and with normal wall thickness. The global systolic function is  mildly reduced. The LVEF is 47%.      2. The right ventricle is normal in cavity size. The global systolic function is mildly reduced. The RVEF  is 47%.      3. The left atrium is moderately dilated, the right atrium is mildly dilated.      4. Aortic and mitral regurgitation is present.      5. There is mid myocardial late gadolinium enhancement in the basal lateral " segment consistent with non  ischemic myocardial fibrosis.      6. There is no pericardial effusion or thickening.     7. There is no intracardiac thrombus.     CONCLUSIONS:   1. Non-ischemic cardiomyopathy with mild left ventricular dilatation and mild systolic dysfunction, LVEF  47%.   2. Normal right ventricular size with mildly reduced systolic function, RVEF 47%.     When compared to prior CMR from 9/29/2016, the LV function is mildly improved.     CMri 9/2016  1. Moderately dilated left ventricular size and mildly reduced systolic function with a calculated ejection fraction of 42 %.  2. Normal right ventricular size and mildly reduced systolic function with a calculated ejection fraction of 47%.   3. On delayed enhancement imaging, there is focal epicardial hyperenhancement in the basal inferolateral wall that appears less  prominent as compared to cardiac MRI 1/16.     Assessment and recommendations:  Mr. Soto is 66 year old male with a PMHx of NICM (LVEF 42%), CKD, HTN, Atrial Fibrillation on AC, PVC's s/p ablation who presents for follow up.     #Atrial Fibrillation:  - Stable and infrequent symptoms    #NICM   -Patient's LVEF was 47% on the last MRI. Latest (today) pending, but no symptoms to suggest a problem     #PVC's   -EKG showed no PVC's   -Continue Amio 200 eOD     RTC 1 yr, but will check MR and adjust clinic if needed    Álvaro Killian MD, MS  Cardiovascular Fellow, PGY-4      I very much appreciated the opportunity to see and assess Mr Endy Soto in the clinic today    I agree with the note above which summarizes my findings and current recommendations    Please do not hesitate to contact my office if you have any questions or concerns.      Nelson Pérez MD  Cardiac Arrhythmia Service  [facility]  [phone]

## 2019-04-05 LAB
INTERPRETATION ECG - MUSE: NORMAL
INTERPRETATION ECG - MUSE: NORMAL

## 2019-08-03 DIAGNOSIS — I48.0 PAROXYSMAL ATRIAL FIBRILLATION (H): ICD-10-CM

## 2019-08-05 RX ORDER — WARFARIN SODIUM 5 MG/1
TABLET ORAL
Qty: 130 TABLET | Refills: 3 | Status: SHIPPED | OUTPATIENT
Start: 2019-08-05

## 2019-08-05 NOTE — TELEPHONE ENCOUNTER
warfarin (COUMADIN) 5 MG     Last Written Prescription Date:  6/25/18  Last Fill Quantity: 130,   # refills: 3  Last Office Visit : 4/4/19  Future Office visit:  NONE    Routing refill request to provider for review/approval because:  Drug not on the refill protocol    ABNORM. INR

## 2019-11-03 ENCOUNTER — HEALTH MAINTENANCE LETTER (OUTPATIENT)
Age: 69
End: 2019-11-03

## 2020-02-10 ENCOUNTER — HEALTH MAINTENANCE LETTER (OUTPATIENT)
Age: 70
End: 2020-02-10

## 2020-11-16 ENCOUNTER — HEALTH MAINTENANCE LETTER (OUTPATIENT)
Age: 70
End: 2020-11-16

## 2021-04-03 ENCOUNTER — HEALTH MAINTENANCE LETTER (OUTPATIENT)
Age: 71
End: 2021-04-03

## 2021-09-18 ENCOUNTER — HEALTH MAINTENANCE LETTER (OUTPATIENT)
Age: 71
End: 2021-09-18

## 2021-11-10 ENCOUNTER — TELEPHONE (OUTPATIENT)
Dept: CARDIOLOGY | Facility: CLINIC | Age: 71
End: 2021-11-10
Payer: COMMERCIAL

## 2021-11-10 NOTE — TELEPHONE ENCOUNTER
[facility] Call Center    Phone Message    May a detailed message be left on voicemail: yes     Reason for Call: Other: Endy called and made an appointment with Dr. Pérez for 11/24/21 and now he is wondering if Dr. Pérez would like him to have an MRI? If he does, Endy would like to get it done a few days before his appointment so he can go over it with him in person. Please advise. Thank you.     Action Taken: Message routed to:  [facility] ([facility] Cardio    Travel Screening: Not Applicable

## 2021-11-12 DIAGNOSIS — Z79.899 ON AMIODARONE THERAPY: ICD-10-CM

## 2021-11-12 DIAGNOSIS — I47.20 VENTRICULAR TACHYARRHYTHMIA (H): ICD-10-CM

## 2021-11-12 DIAGNOSIS — I48.0 PAROXYSMAL ATRIAL FIBRILLATION (H): ICD-10-CM

## 2021-11-12 DIAGNOSIS — I49.3 PVC'S (PREMATURE VENTRICULAR CONTRACTIONS): ICD-10-CM

## 2021-11-12 DIAGNOSIS — I42.9 CARDIOMYOPATHY (H): Primary | ICD-10-CM

## 2021-11-12 NOTE — TELEPHONE ENCOUNTER
Nelson Pérez MD  You 31 minutes ago (2:15 PM)     DB    Let's get MR when we can   He could delay clinic until January   If he is ok with delay   DGB    Message text        Pt also needs labs, and PFT's, orders entered. Routed to EP  to assist in scheduling.     Crystal Graff RN on 11/12/2021 at 2:47 PM

## 2021-11-17 ENCOUNTER — MYC MEDICAL ADVICE (OUTPATIENT)
Dept: CARDIOLOGY | Facility: CLINIC | Age: 71
End: 2021-11-17
Payer: COMMERCIAL

## 2021-11-19 ASSESSMENT — ENCOUNTER SYMPTOMS
STIFFNESS: 0
HYPOTENSION: 0
NECK PAIN: 1
PALPITATIONS: 1
LEG PAIN: 0
SLEEP DISTURBANCES DUE TO BREATHING: 0
ORTHOPNEA: 0
BACK PAIN: 0
EXERCISE INTOLERANCE: 0
MUSCLE WEAKNESS: 0
MUSCLE CRAMPS: 0
SYNCOPE: 0
LIGHT-HEADEDNESS: 0
JOINT SWELLING: 0
ARTHRALGIAS: 0
HYPERTENSION: 1
MYALGIAS: 1

## 2021-11-24 ENCOUNTER — OFFICE VISIT (OUTPATIENT)
Dept: CARDIOLOGY | Facility: CLINIC | Age: 71
End: 2021-11-24
Attending: INTERNAL MEDICINE
Payer: MEDICARE

## 2021-11-24 VITALS
SYSTOLIC BLOOD PRESSURE: 164 MMHG | WEIGHT: 234.8 LBS | BODY MASS INDEX: 29.35 KG/M2 | DIASTOLIC BLOOD PRESSURE: 89 MMHG | HEART RATE: 58 BPM | OXYGEN SATURATION: 97 %

## 2021-11-24 DIAGNOSIS — I47.20 VENTRICULAR TACHYARRHYTHMIA (H): Primary | ICD-10-CM

## 2021-11-24 DIAGNOSIS — R00.2 PALPITATIONS: ICD-10-CM

## 2021-11-24 DIAGNOSIS — I42.8 NONISCHEMIC CARDIOMYOPATHY (H): ICD-10-CM

## 2021-11-24 DIAGNOSIS — I48.0 PAROXYSMAL ATRIAL FIBRILLATION (H): Primary | ICD-10-CM

## 2021-11-24 LAB
DLCOUNC-%PRED-PRE: 121 %
DLCOUNC-PRE: 36.04 ML/MIN/MMHG
DLCOUNC-PRED: 29.7 ML/MIN/MMHG
ERV-%PRED-PRE: 111 %
ERV-PRE: 0.67 L
ERV-PRED: 0.6 L
EXPTIME-PRE: 8.05 SEC
FEF2575-%PRED-PRE: 96 %
FEF2575-PRE: 2.56 L/SEC
FEF2575-PRED: 2.65 L/SEC
FEFMAX-%PRED-PRE: 107 %
FEFMAX-PRE: 10 L/SEC
FEFMAX-PRED: 9.32 L/SEC
FEV1-%PRED-PRE: 96 %
FEV1-PRE: 3.52 L
FEV1FEV6-PRE: 76 %
FEV1FEV6-PRED: 77 %
FEV1FVC-PRE: 74 %
FEV1FVC-PRED: 75 %
FEV1SVC-PRE: 73 %
FEV1SVC-PRED: 74 %
FIFMAX-PRE: 8.34 L/SEC
FVC-%PRED-PRE: 96 %
FVC-PRE: 4.78 L
FVC-PRED: 4.93 L
IC-%PRED-PRE: 96 %
IC-PRE: 4.18 L
IC-PRED: 4.33 L
VA-%PRED-PRE: 95 %
VA-PRE: 7.23 L
VC-%PRED-PRE: 98 %
VC-PRE: 4.85 L
VC-PRED: 4.93 L

## 2021-11-24 PROCEDURE — 94375 RESPIRATORY FLOW VOLUME LOOP: CPT | Performed by: INTERNAL MEDICINE

## 2021-11-24 PROCEDURE — 93005 ELECTROCARDIOGRAM TRACING: CPT

## 2021-11-24 PROCEDURE — G0463 HOSPITAL OUTPT CLINIC VISIT: HCPCS | Mod: 25

## 2021-11-24 PROCEDURE — 99214 OFFICE O/P EST MOD 30 MIN: CPT | Performed by: INTERNAL MEDICINE

## 2021-11-24 PROCEDURE — 94729 DIFFUSING CAPACITY: CPT | Performed by: INTERNAL MEDICINE

## 2021-11-24 ASSESSMENT — PAIN SCALES - GENERAL: PAINLEVEL: NO PAIN (0)

## 2021-11-24 NOTE — LETTER
11/24/2021    RE: Endy Soto  [address] [facility] [address]     Dear Colleague,    Thank you for the opportunity to participate in the care of your patient, Endy Soto, at the [facility] CLINIC [place] [facility]. Please see a copy of my visit note below.    HPI:     Hamlet is a 71-year-old male with nonischemic cardiomyopathy who has been followed for his left ventricular function given the relatively frequent PVCs. Previous ablation did result in some reduction of PVCs but also because pericardial effusion and had to be terminated early. Currently he is being treated with very low-dose amiodarone and is tolerating it well.    At today's visit patient is feeling well with no complaints. He is here with his spouse who agrees.    Hamlet has no new cardiovascular complaints. He does have a scheduled stress MRI for January and we will revisit his LV function at that time. For now he does not seem to have sufficient frequency of PVCs to cause and need for intervention..    PAST MEDICAL HISTORY:  Past Medical History:   Diagnosis Date     Atrial fibrillation (H)      Cardiomyopathy (H)      CKD (chronic kidney disease)      Hypertension      Other premature beats      Palpitations      CURRENT MEDICATIONS:  Current Outpatient Medications   Medication Sig Dispense Refill     amiodarone (PACERONE/CODARONE) 200 MG tablet Take 1 tablet (200 mg) by mouth every other day 93 tablet 3     amLODIPine (NORVASC) 5 MG tablet Take 1 tablet (5 mg) by mouth daily 30 tablet      co-enzyme Q-10 (COQ10) 100 MG CAPS Take  by mouth daily. 1 caps       fish oil-omega-3 fatty acids (FISH OIL) 1000 MG capsule Take 1 g by mouth daily.       fluticasone (FLONASE) 50 MCG/ACT spray 2 sprays       lisinopril (PRINIVIL/ZESTRIL) 20 MG tablet Take 1 tablet (20 mg) by mouth daily In am 93 tablet 3     Magnesium Oxide 250 MG TABS 250 mg       metoprolol (TOPROL-XL) 25 MG 24 hr  tablet Take 1 tablet (25 mg) by mouth daily 30 tablet      Multiple Vitamin (MULTI VITAMIN MENS PO) Take  by mouth. 1 tablet daily       warfarin (COUMADIN) 5 MG tablet TAKE 1 TO 1 AND 1/2 TABLETS (5 TO 7.5MG) DAILY 130 tablet 3     PAST SURGICAL HISTORY:  Past Surgical History:   Procedure Laterality Date     ANESTHESIA CARDIOVERSION N/A 3/17/2015    Procedure: ANESTHESIA CARDIOVERSION;  Surgeon: Generic Anesthesia Provider;  Location: [facility] OR     ANESTHESIA CARDIOVERSION N/A 3/2/2016    Procedure: ANESTHESIA CARDIOVERSION;  Surgeon: GENERIC ANESTHESIA PROVIDER;  Location: [facility] OR     ANESTHESIA CARDIOVERSION N/A 8/8/2017    Procedure: ANESTHESIA CARDIOVERSION;  Anesthesia Offsite Cardioversion @1330;  Surgeon: GENERIC ANESTHESIA PROVIDER;  Location: [facility] OR     EP ABLATION / EP STUDIES  02/26/16    PVC ablation w/P. effusion/tamponade     LAPAROSCOPIC CHOLECYSTECTOMY  2/25/2014    Procedure: LAPAROSCOPIC CHOLECYSTECTOMY;  Laparoscopic Cholecystectomy;  Surgeon: Sulma Fenton MD;  Location: [facility] OR     ALLERGIES:   No Known Allergies    FAMILY HISTORY:  No family history on file.     SOCIAL HISTORY:  Social History     Tobacco Use     Smoking status: Never Smoker     Smokeless tobacco: Never Used   Substance Use Topics     Alcohol use: No     Drug use: No     ROS:   Constitutional: No fever, chills, or sweats. Weight stable.   ENT: No visual disturbance, ear ache, epistaxis, sore throat.   Cardiovascular: As per HPI.   Respiratory: No cough, hemoptysis.    GI: No nausea, vomiting,  : No hematuria.   Integument: Negative.   Psychiatric: Negative.   Hematologic:   no easy bleeding.  Neuro: Negative.   Endocrinology: No significant heat or cold intolerance   Musculoskeletal: No myalgia.    Exam:  BP (!) 164/89 (BP Location: Right arm, Patient Position: Sitting, Cuff Size: Adult Large)   Pulse 58   Wt 106.5 kg (234 lb 12.8 oz)   SpO2 97%   BMI 29.35 kg/m    GENERAL APPEARANCE: healthy, alert and no  distress  HEENT: no icterus, no xanthelasmas, normal pupil size and reaction, normal palate, mucosa moist, no central cyanosis  NECK: no adenopathy, no asymmetry, masses, or scars, thyroid normal to palpation and no bruits, JVP not elevated  RESPIRATORY: lungs clear to auscultation - no rales, rhonchi or wheezes, no use of accessory muscles, no retractions, respirations are unlabored, normal respiratory rate  CARDIOVASCULAR: regular rhythm, normal S1 with physiologic split S2, no S3 or S4 and no murmur, click or rub, precordium quiet with normal PMI.  ABDOMEN: soft, non tender, without hepatosplenomegaly, no masses palpable, bowel sounds normal, aorta not enlarged by palpation, no abdominal bruits  EXTREMITIES: peripheral pulses normal, no edema, no bruits  NEURO: alert and oriented to person/place/time, normal speech, gait and affect    SKIN: no ecchymoses, no rashes    Labs:  CBC RESULTS:   Lab Results   Component Value Date    WBC 6.1 03/19/2019    RBC 5.49 03/19/2019    HGB 16.5 03/19/2019    HCT 48.6 03/19/2019    MCV 89 03/19/2019    MCH 30.1 03/19/2019    MCHC 34.0 03/19/2019    RDW 13.1 03/19/2019     03/19/2019     04/27/2017     BMP RESULTS:  Lab Results   Component Value Date     03/19/2019    POTASSIUM 4.1 03/19/2019    CHLORIDE 103 03/19/2019    CO2 26 03/19/2019    ANIONGAP 8 03/19/2019     (A) 03/19/2019    BUN 14 03/19/2019    CR 1.41 (H) 03/19/2019    GFRESTIMATED 60 03/19/2019    GFRESTBLACK 50 03/19/2019    STEPHEN 10.6 (H) 03/19/2019        INR RESULTS:  Lab Results   Component Value Date    INR 1.7 (H) 03/19/2019    INR 2.05 (H) 08/08/2017    INR 1.08 03/02/2016    INR 1.14 03/01/2016     Procedures:  PULMONARY FUNCTION TESTS:   PFT Latest Ref Rng & Units 11/24/2021   FVC L 4.78   FEV1 L 3.52   FVC% % 96   FEV1% % 96     ECHOCARDIOGRAM:   No results found for this or any previous visit (from the past 8760 hour(s)).    Assessment and Plan:  1. Nonischemic  cardiomyopathy-clinically stable. Patient feeling well  2. Premature ventricular contractions-controlled currently with very low-dose amiodarone  3. No evidence of amiodarone related adverse effects    Plan  1. Continue current medications  2. We will follow-up stress MRI when it is completed in January  3. Follow-up in this clinic about 1 year.    Total elapsed time today with chart review, clinic visit and documentation 30 minutes    I very much appreciated the opportunity to see and assess Endy CASSIE CallowayBrittany in the clinic today. Please do not hesitate to contact my office if you have any questions or concerns.      Nelson Pérez MD  Cardiac Arrhythmia Service  [facility]  [phone]    CC  SANDEEP MAY

## 2021-11-24 NOTE — PROGRESS NOTES
HPI:     Hamlet is a 71-year-old male with nonischemic cardiomyopathy who has been followed for his left ventricular function given the relatively frequent PVCs. Previous ablation did result in some reduction of PVCs but also because pericardial effusion and had to be terminated early. Currently he is being treated with very low-dose amiodarone and is tolerating it well.    At today's visit patient is feeling well with no complaints. He is here with his spouse who agrees.    Hamlet has no new cardiovascular complaints. He does have a scheduled stress MRI for January and we will revisit his LV function at that time. For now he does not seem to have sufficient frequency of PVCs to cause and need for intervention..    PAST MEDICAL HISTORY:  Past Medical History:   Diagnosis Date     Atrial fibrillation (H)      Cardiomyopathy (H)      CKD (chronic kidney disease)      Hypertension      Other premature beats      Palpitations        CURRENT MEDICATIONS:  Current Outpatient Medications   Medication Sig Dispense Refill     amiodarone (PACERONE/CODARONE) 200 MG tablet Take 1 tablet (200 mg) by mouth every other day 93 tablet 3     amLODIPine (NORVASC) 5 MG tablet Take 1 tablet (5 mg) by mouth daily 30 tablet      co-enzyme Q-10 (COQ10) 100 MG CAPS Take  by mouth daily. 1 caps       fish oil-omega-3 fatty acids (FISH OIL) 1000 MG capsule Take 1 g by mouth daily.       fluticasone (FLONASE) 50 MCG/ACT spray 2 sprays       lisinopril (PRINIVIL/ZESTRIL) 20 MG tablet Take 1 tablet (20 mg) by mouth daily In am 93 tablet 3     Magnesium Oxide 250 MG TABS 250 mg       metoprolol (TOPROL-XL) 25 MG 24 hr tablet Take 1 tablet (25 mg) by mouth daily 30 tablet      Multiple Vitamin (MULTI VITAMIN MENS PO) Take  by mouth. 1 tablet daily       warfarin (COUMADIN) 5 MG tablet TAKE 1 TO 1 AND 1/2 TABLETS (5 TO 7.5MG) DAILY 130 tablet 3       PAST SURGICAL HISTORY:  Past Surgical History:   Procedure Laterality Date     ANESTHESIA CARDIOVERSION  N/A 3/17/2015    Procedure: ANESTHESIA CARDIOVERSION;  Surgeon: Generic Anesthesia Provider;  Location: [facility] OR     ANESTHESIA CARDIOVERSION N/A 3/2/2016    Procedure: ANESTHESIA CARDIOVERSION;  Surgeon: GENERIC ANESTHESIA PROVIDER;  Location: [facility] OR     ANESTHESIA CARDIOVERSION N/A 8/8/2017    Procedure: ANESTHESIA CARDIOVERSION;  Anesthesia Offsite Cardioversion @1330;  Surgeon: GENERIC ANESTHESIA PROVIDER;  Location: [facility] OR     EP ABLATION / EP STUDIES  02/26/16    PVC ablation w/P. effusion/tamponade     LAPAROSCOPIC CHOLECYSTECTOMY  2/25/2014    Procedure: LAPAROSCOPIC CHOLECYSTECTOMY;  Laparoscopic Cholecystectomy;  Surgeon: Sulma Fenton MD;  Location: [facility] OR       ALLERGIES:   No Known Allergies    FAMILY HISTORY:  No family history on file.       SOCIAL HISTORY:  Social History     Tobacco Use     Smoking status: Never Smoker     Smokeless tobacco: Never Used   Substance Use Topics     Alcohol use: No     Drug use: No       ROS:   Constitutional: No fever, chills, or sweats. Weight stable.   ENT: No visual disturbance, ear ache, epistaxis, sore throat.   Cardiovascular: As per HPI.   Respiratory: No cough, hemoptysis.    GI: No nausea, vomiting,  : No hematuria.   Integument: Negative.   Psychiatric: Negative.   Hematologic:   no easy bleeding.  Neuro: Negative.   Endocrinology: No significant heat or cold intolerance   Musculoskeletal: No myalgia.    Exam:  BP (!) 164/89 (BP Location: Right arm, Patient Position: Sitting, Cuff Size: Adult Large)   Pulse 58   Wt 106.5 kg (234 lb 12.8 oz)   SpO2 97%   BMI 29.35 kg/m    GENERAL APPEARANCE: healthy, alert and no distress  HEENT: no icterus, no xanthelasmas, normal pupil size and reaction, normal palate, mucosa moist, no central cyanosis  NECK: no adenopathy, no asymmetry, masses, or scars, thyroid normal to palpation and no bruits, JVP not elevated  RESPIRATORY: lungs clear to auscultation - no rales, rhonchi or wheezes, no use of accessory  muscles, no retractions, respirations are unlabored, normal respiratory rate  CARDIOVASCULAR: regular rhythm, normal S1 with physiologic split S2, no S3 or S4 and no murmur, click or rub, precordium quiet with normal PMI.  ABDOMEN: soft, non tender, without hepatosplenomegaly, no masses palpable, bowel sounds normal, aorta not enlarged by palpation, no abdominal bruits  EXTREMITIES: peripheral pulses normal, no edema, no bruits  NEURO: alert and oriented to person/place/time, normal speech, gait and affect    SKIN: no ecchymoses, no rashes    Labs:  CBC RESULTS:   Lab Results   Component Value Date    WBC 6.1 03/19/2019    RBC 5.49 03/19/2019    HGB 16.5 03/19/2019    HCT 48.6 03/19/2019    MCV 89 03/19/2019    MCH 30.1 03/19/2019    MCHC 34.0 03/19/2019    RDW 13.1 03/19/2019     03/19/2019     04/27/2017       BMP RESULTS:  Lab Results   Component Value Date     03/19/2019    POTASSIUM 4.1 03/19/2019    CHLORIDE 103 03/19/2019    CO2 26 03/19/2019    ANIONGAP 8 03/19/2019     (A) 03/19/2019    BUN 14 03/19/2019    CR 1.41 (H) 03/19/2019    GFRESTIMATED 60 03/19/2019    GFRESTBLACK 50 03/19/2019    STEPHEN 10.6 (H) 03/19/2019        INR RESULTS:  Lab Results   Component Value Date    INR 1.7 (H) 03/19/2019    INR 2.05 (H) 08/08/2017    INR 1.08 03/02/2016    INR 1.14 03/01/2016       Procedures:  PULMONARY FUNCTION TESTS:   PFT Latest Ref Rng & Units 11/24/2021   FVC L 4.78   FEV1 L 3.52   FVC% % 96   FEV1% % 96         ECHOCARDIOGRAM:   No results found for this or any previous visit (from the past 8760 hour(s)).      Assessment and Plan:  1. Nonischemic cardiomyopathy-clinically stable. Patient feeling well  2. Premature ventricular contractions-controlled currently with very low-dose amiodarone  3. No evidence of amiodarone related adverse effects    Plan  1. Continue current medications  2. We will follow-up stress MRI when it is completed in January  3. Follow-up in this clinic about 1  year.    Total elapsed time today with chart review, clinic visit and documentation 30 minutes    I very much appreciated the opportunity to see and assess Endy Soto in the clinic today. Please do not hesitate to contact my office if you have any questions or concerns.      Nelson Pérez MD  Cardiac Arrhythmia Service  [facility]  [phone]      CC  SANDEEP MAY

## 2021-11-24 NOTE — PATIENT INSTRUCTIONS
You were seen in the Electrophysiology Clinic today by: Dr Pérez    Plan:       Follow up visit:    1 year with Dr Pérez      Your Care Team:  EP Cardiology   Telephone Number     Nurse Line  Crystal Graff RN  [phone]     For scheduling appts or procedures:    Sheela Franklin   [phone]   For the Device Clinic (Pacemakers, ICDs, Loop Recorders)    During business hours: [phone]  After business hours:   [phone]- select option 4 and ask for job code [id number].     On-call cardiologist for after hours or on weekends: [phone], option #4, and ask to speak to the on-call cardiologist.     Cardiovascular Clinic:   [address] [place] [address]. [place] [address]      As always, Thank you for trusting us with your health care needs!

## 2021-11-24 NOTE — NURSING NOTE
Chief Complaint   Patient presents with     Follow Up     2.5 year f/u for A fib, PVC's, NICM, HTN   Vitals were taken and medications reconciled.    Km Bradshaw, EMT  12:47 PM

## 2021-11-26 LAB
ATRIAL RATE - MUSE: 64 BPM
DIASTOLIC BLOOD PRESSURE - MUSE: NORMAL MMHG
INTERPRETATION ECG - MUSE: NORMAL
P AXIS - MUSE: 18 DEGREES
PR INTERVAL - MUSE: 164 MS
QRS DURATION - MUSE: 116 MS
QT - MUSE: 454 MS
QTC - MUSE: 468 MS
R AXIS - MUSE: -7 DEGREES
SYSTOLIC BLOOD PRESSURE - MUSE: NORMAL MMHG
T AXIS - MUSE: 61 DEGREES
VENTRICULAR RATE- MUSE: 64 BPM

## 2022-01-13 ENCOUNTER — HOSPITAL ENCOUNTER (OUTPATIENT)
Dept: MRI IMAGING | Facility: CLINIC | Age: 72
Discharge: HOME | End: 2022-01-13
Attending: INTERNAL MEDICINE | Admitting: INTERNAL MEDICINE
Payer: MEDICARE

## 2022-01-13 ENCOUNTER — MYC MEDICAL ADVICE (OUTPATIENT)
Dept: CARDIOLOGY | Facility: CLINIC | Age: 72
End: 2022-01-13

## 2022-01-13 VITALS
SYSTOLIC BLOOD PRESSURE: 152 MMHG | HEART RATE: 65 BPM | OXYGEN SATURATION: 95 % | DIASTOLIC BLOOD PRESSURE: 83 MMHG | RESPIRATION RATE: 16 BRPM

## 2022-01-13 DIAGNOSIS — I48.0 PAROXYSMAL ATRIAL FIBRILLATION (H): ICD-10-CM

## 2022-01-13 DIAGNOSIS — I49.3 PVC'S (PREMATURE VENTRICULAR CONTRACTIONS): ICD-10-CM

## 2022-01-13 DIAGNOSIS — Z79.899 ON AMIODARONE THERAPY: ICD-10-CM

## 2022-01-13 DIAGNOSIS — I42.9 CARDIOMYOPATHY (H): ICD-10-CM

## 2022-01-13 DIAGNOSIS — I47.20 VENTRICULAR TACHYARRHYTHMIA (H): ICD-10-CM

## 2022-01-13 PROCEDURE — 93018 CV STRESS TEST I&R ONLY: CPT | Performed by: STUDENT IN AN ORGANIZED HEALTH CARE EDUCATION/TRAINING PROGRAM

## 2022-01-13 PROCEDURE — 255N000002 HC RX 255 OP 636: Performed by: INTERNAL MEDICINE

## 2022-01-13 PROCEDURE — 250N000011 HC RX IP 250 OP 636: Performed by: STUDENT IN AN ORGANIZED HEALTH CARE EDUCATION/TRAINING PROGRAM

## 2022-01-13 PROCEDURE — A9585 GADOBUTROL INJECTION: HCPCS | Performed by: INTERNAL MEDICINE

## 2022-01-13 PROCEDURE — 93017 CV STRESS TEST TRACING ONLY: CPT

## 2022-01-13 PROCEDURE — 75563 CARD MRI W/STRESS IMG & DYE: CPT | Mod: 26 | Performed by: STUDENT IN AN ORGANIZED HEALTH CARE EDUCATION/TRAINING PROGRAM

## 2022-01-13 PROCEDURE — 75563 CARD MRI W/STRESS IMG & DYE: CPT

## 2022-01-13 PROCEDURE — 93010 ELECTROCARDIOGRAM REPORT: CPT | Mod: 59 | Performed by: INTERNAL MEDICINE

## 2022-01-13 PROCEDURE — 93016 CV STRESS TEST SUPVJ ONLY: CPT | Performed by: STUDENT IN AN ORGANIZED HEALTH CARE EDUCATION/TRAINING PROGRAM

## 2022-01-13 PROCEDURE — 999N000054 HC STATISTIC EKG NON-CHARGEABLE

## 2022-01-13 PROCEDURE — 93005 ELECTROCARDIOGRAM TRACING: CPT

## 2022-01-13 RX ORDER — GADOBUTROL 604.72 MG/ML
10 INJECTION INTRAVENOUS ONCE
Status: COMPLETED | OUTPATIENT
Start: 2022-01-13 | End: 2022-01-13

## 2022-01-13 RX ORDER — AMINOPHYLLINE 25 MG/ML
100 INJECTION, SOLUTION INTRAVENOUS ONCE
Status: COMPLETED | OUTPATIENT
Start: 2022-01-13 | End: 2022-01-13

## 2022-01-13 RX ORDER — DIPHENHYDRAMINE HYDROCHLORIDE 50 MG/ML
25-50 INJECTION INTRAMUSCULAR; INTRAVENOUS
Status: DISCONTINUED | OUTPATIENT
Start: 2022-01-13 | End: 2022-01-14 | Stop reason: HOSPADM

## 2022-01-13 RX ORDER — REGADENOSON 0.08 MG/ML
0.4 INJECTION, SOLUTION INTRAVENOUS ONCE
Status: COMPLETED | OUTPATIENT
Start: 2022-01-13 | End: 2022-01-13

## 2022-01-13 RX ORDER — ONDANSETRON 2 MG/ML
4 INJECTION INTRAMUSCULAR; INTRAVENOUS
Status: DISCONTINUED | OUTPATIENT
Start: 2022-01-13 | End: 2022-01-14 | Stop reason: HOSPADM

## 2022-01-13 RX ORDER — METHYLPREDNISOLONE SODIUM SUCCINATE 125 MG/2ML
125 INJECTION, POWDER, LYOPHILIZED, FOR SOLUTION INTRAMUSCULAR; INTRAVENOUS
Status: DISCONTINUED | OUTPATIENT
Start: 2022-01-13 | End: 2022-01-14 | Stop reason: HOSPADM

## 2022-01-13 RX ORDER — ACYCLOVIR 200 MG/1
0-1 CAPSULE ORAL
Status: DISCONTINUED | OUTPATIENT
Start: 2022-01-13 | End: 2022-01-14 | Stop reason: HOSPADM

## 2022-01-13 RX ORDER — CAFFEINE CITRATE 20 MG/ML
60 SOLUTION INTRAVENOUS
Status: DISCONTINUED | OUTPATIENT
Start: 2022-01-13 | End: 2022-01-14 | Stop reason: HOSPADM

## 2022-01-13 RX ORDER — DIAZEPAM 5 MG
5 TABLET ORAL EVERY 30 MIN PRN
Status: DISCONTINUED | OUTPATIENT
Start: 2022-01-13 | End: 2022-01-14 | Stop reason: HOSPADM

## 2022-01-13 RX ORDER — DIPHENHYDRAMINE HCL 25 MG
25 CAPSULE ORAL
Status: DISCONTINUED | OUTPATIENT
Start: 2022-01-13 | End: 2022-01-14 | Stop reason: HOSPADM

## 2022-01-13 RX ORDER — ALBUTEROL SULFATE 90 UG/1
2 AEROSOL, METERED RESPIRATORY (INHALATION) EVERY 5 MIN PRN
Status: DISCONTINUED | OUTPATIENT
Start: 2022-01-13 | End: 2022-01-14 | Stop reason: HOSPADM

## 2022-01-13 RX ADMIN — GADOBUTROL 10 ML: 604.72 INJECTION INTRAVENOUS at 14:44

## 2022-01-13 RX ADMIN — REGADENOSON 0.4 MG: 0.08 INJECTION, SOLUTION INTRAVENOUS at 14:00

## 2022-01-13 RX ADMIN — AMINOPHYLLINE 100 MG: 25 INJECTION, SOLUTION INTRAVENOUS at 14:04

## 2022-01-13 NOTE — PROGRESS NOTES
Pt here for MRI Lexiscan stress test. Lungs clear. EKG shows sinus rhythm with PVC's. Pt denies any caffeine use within the last 12 hours. Pt was given Lexiscan 0.4mg IV over 10 seconds. Pt tolerated the dose without any adverse effects. Aminophylline 100 mg IV was given to reverse the effects of the lexiscan. VSS Pt will be monitored for the remainder of the scan and will have a post EKG once the scan is complete.

## 2022-01-14 LAB
ATRIAL RATE - MUSE: 66 BPM
DIASTOLIC BLOOD PRESSURE - MUSE: NORMAL MMHG
INTERPRETATION ECG - MUSE: NORMAL
P AXIS - MUSE: 32 DEGREES
PR INTERVAL - MUSE: 160 MS
QRS DURATION - MUSE: 118 MS
QT - MUSE: 424 MS
QTC - MUSE: 444 MS
R AXIS - MUSE: 6 DEGREES
SYSTOLIC BLOOD PRESSURE - MUSE: NORMAL MMHG
T AXIS - MUSE: 74 DEGREES
VENTRICULAR RATE- MUSE: 66 BPM

## 2022-01-17 LAB
ATRIAL RATE - MUSE: 65 BPM
DIASTOLIC BLOOD PRESSURE - MUSE: NORMAL MMHG
INTERPRETATION ECG - MUSE: NORMAL
P AXIS - MUSE: 30 DEGREES
PR INTERVAL - MUSE: 170 MS
QRS DURATION - MUSE: 124 MS
QT - MUSE: 450 MS
QTC - MUSE: 468 MS
R AXIS - MUSE: 9 DEGREES
SYSTOLIC BLOOD PRESSURE - MUSE: NORMAL MMHG
T AXIS - MUSE: 81 DEGREES
VENTRICULAR RATE- MUSE: 65 BPM

## 2022-02-07 NOTE — TELEPHONE ENCOUNTER
Nelson Pérez MD McDonald, Dana L, RN  Crystal GAMBOA is hoding OK despite PVCs   No  scar  of  concern   We will just follow   No intervention needed   DB

## 2022-04-30 ENCOUNTER — HEALTH MAINTENANCE LETTER (OUTPATIENT)
Age: 72
End: 2022-04-30

## 2022-10-19 ENCOUNTER — PATIENT OUTREACH (OUTPATIENT)
Dept: CARDIOLOGY | Facility: CLINIC | Age: 72
End: 2022-10-19

## 2022-10-19 NOTE — TELEPHONE ENCOUNTER
10/19/22 : Attempted to reach patient to schedule follow up in the Cardiovascular Clinic.  SANDI and Jose Armando message sent.     Schedule with Dr. Nelson Pérez. DH

## 2022-10-25 ENCOUNTER — TELEPHONE (OUTPATIENT)
Dept: CARDIOLOGY | Facility: CLINIC | Age: 72
End: 2022-10-25

## 2022-11-19 ENCOUNTER — HEALTH MAINTENANCE LETTER (OUTPATIENT)
Age: 72
End: 2022-11-19

## 2023-06-01 ENCOUNTER — HEALTH MAINTENANCE LETTER (OUTPATIENT)
Age: 73
End: 2023-06-01

## 2024-06-16 ENCOUNTER — HEALTH MAINTENANCE LETTER (OUTPATIENT)
Age: 74
End: 2024-06-16

## 2025-06-21 ENCOUNTER — HEALTH MAINTENANCE LETTER (OUTPATIENT)
Age: 75
End: 2025-06-21

## (undated) RX ORDER — REGADENOSON 0.08 MG/ML
INJECTION, SOLUTION INTRAVENOUS
Status: DISPENSED
Start: 2019-04-04

## (undated) RX ORDER — REGADENOSON 0.08 MG/ML
INJECTION, SOLUTION INTRAVENOUS
Status: DISPENSED
Start: 2022-01-13

## (undated) RX ORDER — AMINOPHYLLINE 25 MG/ML
INJECTION, SOLUTION INTRAVENOUS
Status: DISPENSED
Start: 2022-01-13